# Patient Record
Sex: MALE | Race: BLACK OR AFRICAN AMERICAN | NOT HISPANIC OR LATINO | URBAN - METROPOLITAN AREA
[De-identification: names, ages, dates, MRNs, and addresses within clinical notes are randomized per-mention and may not be internally consistent; named-entity substitution may affect disease eponyms.]

---

## 2021-09-21 ENCOUNTER — EMERGENCY (EMERGENCY)
Facility: HOSPITAL | Age: 31
LOS: 1 days | Discharge: HOME | End: 2021-09-21
Attending: EMERGENCY MEDICINE
Payer: COMMERCIAL

## 2021-09-21 VITALS
RESPIRATION RATE: 14 BRPM | SYSTOLIC BLOOD PRESSURE: 107 MMHG | DIASTOLIC BLOOD PRESSURE: 78 MMHG | OXYGEN SATURATION: 96 % | HEART RATE: 72 BPM | TEMPERATURE: 98 F

## 2021-09-21 VITALS
TEMPERATURE: 98 F | OXYGEN SATURATION: 97 % | HEIGHT: 69 IN | RESPIRATION RATE: 18 BRPM | DIASTOLIC BLOOD PRESSURE: 76 MMHG | SYSTOLIC BLOOD PRESSURE: 117 MMHG | HEART RATE: 85 BPM | WEIGHT: 220.02 LBS

## 2021-09-21 LAB
ALBUMIN SERPL ELPH-MCNC: 4.1 G/DL — SIGNIFICANT CHANGE UP (ref 3.3–5)
ALP SERPL-CCNC: 54 U/L — SIGNIFICANT CHANGE UP (ref 40–120)
ALT FLD-CCNC: 24 U/L — SIGNIFICANT CHANGE UP (ref 10–45)
ANION GAP SERPL CALC-SCNC: 17 MMOL/L — SIGNIFICANT CHANGE UP (ref 5–17)
APTT BLD: 27.3 SEC — LOW (ref 27.5–35.5)
AST SERPL-CCNC: 23 U/L — SIGNIFICANT CHANGE UP (ref 10–40)
BASOPHILS # BLD AUTO: 0.03 K/UL — SIGNIFICANT CHANGE UP (ref 0–0.2)
BASOPHILS NFR BLD AUTO: 0.4 % — SIGNIFICANT CHANGE UP (ref 0–2)
BILIRUB SERPL-MCNC: 0.2 MG/DL — SIGNIFICANT CHANGE UP (ref 0.2–1.2)
BUN SERPL-MCNC: 9 MG/DL — SIGNIFICANT CHANGE UP (ref 7–23)
CALCIUM SERPL-MCNC: 9 MG/DL — SIGNIFICANT CHANGE UP (ref 8.4–10.5)
CHLORIDE SERPL-SCNC: 106 MMOL/L — SIGNIFICANT CHANGE UP (ref 96–108)
CO2 SERPL-SCNC: 18 MMOL/L — LOW (ref 22–31)
CREAT SERPL-MCNC: 1.05 MG/DL — SIGNIFICANT CHANGE UP (ref 0.5–1.3)
EOSINOPHIL # BLD AUTO: 0.18 K/UL — SIGNIFICANT CHANGE UP (ref 0–0.5)
EOSINOPHIL NFR BLD AUTO: 2.2 % — SIGNIFICANT CHANGE UP (ref 0–6)
ETHANOL SERPL-MCNC: 167 MG/DL — HIGH (ref 0–10)
GLUCOSE SERPL-MCNC: 104 MG/DL — HIGH (ref 70–99)
HCT VFR BLD CALC: 46.1 % — SIGNIFICANT CHANGE UP (ref 39–50)
HGB BLD-MCNC: 15.3 G/DL — SIGNIFICANT CHANGE UP (ref 13–17)
IMM GRANULOCYTES NFR BLD AUTO: 0.4 % — SIGNIFICANT CHANGE UP (ref 0–1.5)
INR BLD: 0.98 RATIO — SIGNIFICANT CHANGE UP (ref 0.88–1.16)
LIDOCAIN IGE QN: 15 U/L — SIGNIFICANT CHANGE UP (ref 7–60)
LYMPHOCYTES # BLD AUTO: 2.34 K/UL — SIGNIFICANT CHANGE UP (ref 1–3.3)
LYMPHOCYTES # BLD AUTO: 28 % — SIGNIFICANT CHANGE UP (ref 13–44)
MCHC RBC-ENTMCNC: 28.9 PG — SIGNIFICANT CHANGE UP (ref 27–34)
MCHC RBC-ENTMCNC: 33.2 GM/DL — SIGNIFICANT CHANGE UP (ref 32–36)
MCV RBC AUTO: 87 FL — SIGNIFICANT CHANGE UP (ref 80–100)
MONOCYTES # BLD AUTO: 0.46 K/UL — SIGNIFICANT CHANGE UP (ref 0–0.9)
MONOCYTES NFR BLD AUTO: 5.5 % — SIGNIFICANT CHANGE UP (ref 2–14)
NEUTROPHILS # BLD AUTO: 5.33 K/UL — SIGNIFICANT CHANGE UP (ref 1.8–7.4)
NEUTROPHILS NFR BLD AUTO: 63.5 % — SIGNIFICANT CHANGE UP (ref 43–77)
NRBC # BLD: 0 /100 WBCS — SIGNIFICANT CHANGE UP (ref 0–0)
PLATELET # BLD AUTO: 287 K/UL — SIGNIFICANT CHANGE UP (ref 150–400)
POTASSIUM SERPL-MCNC: 3.9 MMOL/L — SIGNIFICANT CHANGE UP (ref 3.5–5.3)
POTASSIUM SERPL-SCNC: 3.9 MMOL/L — SIGNIFICANT CHANGE UP (ref 3.5–5.3)
PROT SERPL-MCNC: 6.7 G/DL — SIGNIFICANT CHANGE UP (ref 6–8.3)
PROTHROM AB SERPL-ACNC: 11.8 SEC — SIGNIFICANT CHANGE UP (ref 10.6–13.6)
RBC # BLD: 5.3 M/UL — SIGNIFICANT CHANGE UP (ref 4.2–5.8)
RBC # FLD: 12.5 % — SIGNIFICANT CHANGE UP (ref 10.3–14.5)
SODIUM SERPL-SCNC: 141 MMOL/L — SIGNIFICANT CHANGE UP (ref 135–145)
WBC # BLD: 8.37 K/UL — SIGNIFICANT CHANGE UP (ref 3.8–10.5)
WBC # FLD AUTO: 8.37 K/UL — SIGNIFICANT CHANGE UP (ref 3.8–10.5)

## 2021-09-21 PROCEDURE — 80307 DRUG TEST PRSMV CHEM ANLYZR: CPT

## 2021-09-21 PROCEDURE — 74177 CT ABD & PELVIS W/CONTRAST: CPT | Mod: 26,MA

## 2021-09-21 PROCEDURE — 85610 PROTHROMBIN TIME: CPT

## 2021-09-21 PROCEDURE — 83690 ASSAY OF LIPASE: CPT

## 2021-09-21 PROCEDURE — 99284 EMERGENCY DEPT VISIT MOD MDM: CPT | Mod: 25

## 2021-09-21 PROCEDURE — 74177 CT ABD & PELVIS W/CONTRAST: CPT | Mod: MA

## 2021-09-21 PROCEDURE — 85730 THROMBOPLASTIN TIME PARTIAL: CPT

## 2021-09-21 PROCEDURE — 70450 CT HEAD/BRAIN W/O DYE: CPT | Mod: MA

## 2021-09-21 PROCEDURE — 71260 CT THORAX DX C+: CPT | Mod: MA

## 2021-09-21 PROCEDURE — 70450 CT HEAD/BRAIN W/O DYE: CPT | Mod: 26,MA

## 2021-09-21 PROCEDURE — 82962 GLUCOSE BLOOD TEST: CPT

## 2021-09-21 PROCEDURE — 85025 COMPLETE CBC W/AUTO DIFF WBC: CPT

## 2021-09-21 PROCEDURE — 71260 CT THORAX DX C+: CPT | Mod: 26,MA

## 2021-09-21 PROCEDURE — 72125 CT NECK SPINE W/O DYE: CPT | Mod: MA

## 2021-09-21 PROCEDURE — 80053 COMPREHEN METABOLIC PANEL: CPT

## 2021-09-21 PROCEDURE — 99285 EMERGENCY DEPT VISIT HI MDM: CPT

## 2021-09-21 PROCEDURE — 71045 X-RAY EXAM CHEST 1 VIEW: CPT | Mod: 26

## 2021-09-21 PROCEDURE — 71045 X-RAY EXAM CHEST 1 VIEW: CPT

## 2021-09-21 PROCEDURE — 72125 CT NECK SPINE W/O DYE: CPT | Mod: 26,MA

## 2021-09-21 RX ORDER — ACETAMINOPHEN 500 MG
975 TABLET ORAL ONCE
Refills: 0 | Status: COMPLETED | OUTPATIENT
Start: 2021-09-21 | End: 2021-09-21

## 2021-09-21 RX ADMIN — Medication 975 MILLIGRAM(S): at 17:20

## 2021-09-21 NOTE — ED PROVIDER NOTE - ATTENDING CONTRIBUTION TO CARE
Attending MD Chau:   I personally have seen and examined this patient.  Physician assistant note reviewed and agree on plan of care and except where noted.  See HPI, PE, and MDM for details.
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
## 2021-09-21 NOTE — ED PROVIDER NOTE - PATIENT PORTAL LINK FT
You can access the FollowMyHealth Patient Portal offered by [facility] by registering at the following website: [email or web address]. By joining [facility]’s FollowMyHealth portal, you will also be able to view your health information using other applications (apps) compatible with our system.
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
## 2021-09-21 NOTE — ED PROVIDER NOTE - PHYSICAL EXAMINATION
GEN: Pt non-toxic in NAD, A&Ox3. Breath smells of etoh.  PSYCH: Affect and mood appropriate. Conversating appropriately. Nonaggressive.   EYES: Sclera white w/o injection, PERRLA, b/l horizontal nystagmus.  ENT: Head NCAT, small forehead abrasion. Airway patent. No cspine ttp, c-collar in place.   RESP: No chest wall tenderness, CTA b/l, no wheezes, rales, or rhonchi.   CARDIAC: RRR, clear distinct S1, S2, no appreciable murmurs.  ABD: Abdomen soft, non-tender.  MSK: Moving all extremities.  NEURO: No focal motor or sensory deficits.  VASC: Radial pulses 2+ b/l. No edema or calf tenderness.  SKIN: +non-bleeding abrasion L shin.
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
## 2024-06-20 ENCOUNTER — EMERGENCY (EMERGENCY)
Facility: HOSPITAL | Age: 34
LOS: 0 days | Discharge: HOME | End: 2024-06-20
Attending: EMERGENCY MEDICINE
Payer: MEDICAID

## 2024-06-20 VITALS
OXYGEN SATURATION: 97 % | DIASTOLIC BLOOD PRESSURE: 70 MMHG | HEART RATE: 72 BPM | WEIGHT: 166.89 LBS | RESPIRATION RATE: 20 BRPM | HEIGHT: 72 IN | TEMPERATURE: 99 F | SYSTOLIC BLOOD PRESSURE: 113 MMHG

## 2024-06-20 DIAGNOSIS — R07.89 OTHER CHEST PAIN: ICD-10-CM
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
## 2024-06-20 DIAGNOSIS — R06.02 SHORTNESS OF BREATH: ICD-10-CM
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
## 2024-06-20 LAB
ALBUMIN SERPL ELPH-MCNC: 4 G/DL — SIGNIFICANT CHANGE UP (ref 3.5–5.2)
ALP SERPL-CCNC: 51 U/L — SIGNIFICANT CHANGE UP (ref 30–115)
ALT FLD-CCNC: 16 U/L — SIGNIFICANT CHANGE UP (ref 0–41)
ANION GAP SERPL CALC-SCNC: 13 MMOL/L — SIGNIFICANT CHANGE UP (ref 7–14)
AST SERPL-CCNC: 19 U/L — SIGNIFICANT CHANGE UP (ref 0–41)
BASOPHILS # BLD AUTO: 0.04 K/UL — SIGNIFICANT CHANGE UP (ref 0–0.2)
BASOPHILS NFR BLD AUTO: 0.6 % — SIGNIFICANT CHANGE UP (ref 0–1)
BILIRUB SERPL-MCNC: 0.7 MG/DL — SIGNIFICANT CHANGE UP (ref 0.2–1.2)
BUN SERPL-MCNC: 9 MG/DL — LOW (ref 10–20)
CALCIUM SERPL-MCNC: 9.2 MG/DL — SIGNIFICANT CHANGE UP (ref 8.4–10.5)
CHLORIDE SERPL-SCNC: 104 MMOL/L — SIGNIFICANT CHANGE UP (ref 98–110)
CO2 SERPL-SCNC: 24 MMOL/L — SIGNIFICANT CHANGE UP (ref 17–32)
CREAT SERPL-MCNC: 1.2 MG/DL — SIGNIFICANT CHANGE UP (ref 0.7–1.5)
EGFR: 81 ML/MIN/1.73M2 — SIGNIFICANT CHANGE UP
EOSINOPHIL # BLD AUTO: 0.28 K/UL — SIGNIFICANT CHANGE UP (ref 0–0.7)
EOSINOPHIL NFR BLD AUTO: 4.5 % — SIGNIFICANT CHANGE UP (ref 0–8)
GLUCOSE SERPL-MCNC: 89 MG/DL — SIGNIFICANT CHANGE UP (ref 70–99)
HCT VFR BLD CALC: 43.4 % — SIGNIFICANT CHANGE UP (ref 42–52)
HGB BLD-MCNC: 14.8 G/DL — SIGNIFICANT CHANGE UP (ref 14–18)
IMM GRANULOCYTES NFR BLD AUTO: 0.3 % — SIGNIFICANT CHANGE UP (ref 0.1–0.3)
LYMPHOCYTES # BLD AUTO: 2.26 K/UL — SIGNIFICANT CHANGE UP (ref 1.2–3.4)
LYMPHOCYTES # BLD AUTO: 36 % — SIGNIFICANT CHANGE UP (ref 20.5–51.1)
MCHC RBC-ENTMCNC: 30.6 PG — SIGNIFICANT CHANGE UP (ref 27–31)
MCHC RBC-ENTMCNC: 34.1 G/DL — SIGNIFICANT CHANGE UP (ref 32–37)
MCV RBC AUTO: 89.7 FL — SIGNIFICANT CHANGE UP (ref 80–94)
MONOCYTES # BLD AUTO: 0.46 K/UL — SIGNIFICANT CHANGE UP (ref 0.1–0.6)
MONOCYTES NFR BLD AUTO: 7.3 % — SIGNIFICANT CHANGE UP (ref 1.7–9.3)
NEUTROPHILS # BLD AUTO: 3.21 K/UL — SIGNIFICANT CHANGE UP (ref 1.4–6.5)
NEUTROPHILS NFR BLD AUTO: 51.3 % — SIGNIFICANT CHANGE UP (ref 42.2–75.2)
NRBC # BLD: 0 /100 WBCS — SIGNIFICANT CHANGE UP (ref 0–0)
PLATELET # BLD AUTO: 254 K/UL — SIGNIFICANT CHANGE UP (ref 130–400)
PMV BLD: 10.9 FL — HIGH (ref 7.4–10.4)
POTASSIUM SERPL-MCNC: 3.8 MMOL/L — SIGNIFICANT CHANGE UP (ref 3.5–5)
POTASSIUM SERPL-SCNC: 3.8 MMOL/L — SIGNIFICANT CHANGE UP (ref 3.5–5)
PROT SERPL-MCNC: 6.4 G/DL — SIGNIFICANT CHANGE UP (ref 6–8)
RBC # BLD: 4.84 M/UL — SIGNIFICANT CHANGE UP (ref 4.7–6.1)
RBC # FLD: 12.3 % — SIGNIFICANT CHANGE UP (ref 11.5–14.5)
SODIUM SERPL-SCNC: 141 MMOL/L — SIGNIFICANT CHANGE UP (ref 135–146)
TROPONIN T, HIGH SENSITIVITY RESULT: 12 NG/L — SIGNIFICANT CHANGE UP (ref 6–21)
WBC # BLD: 6.27 K/UL — SIGNIFICANT CHANGE UP (ref 4.8–10.8)
WBC # FLD AUTO: 6.27 K/UL — SIGNIFICANT CHANGE UP (ref 4.8–10.8)

## 2024-06-20 PROCEDURE — 36000 PLACE NEEDLE IN VEIN: CPT

## 2024-06-20 PROCEDURE — 71045 X-RAY EXAM CHEST 1 VIEW: CPT | Mod: 26

## 2024-06-20 PROCEDURE — 93010 ELECTROCARDIOGRAM REPORT: CPT

## 2024-06-20 PROCEDURE — 84484 ASSAY OF TROPONIN QUANT: CPT

## 2024-06-20 PROCEDURE — 36415 COLL VENOUS BLD VENIPUNCTURE: CPT

## 2024-06-20 PROCEDURE — 93005 ELECTROCARDIOGRAM TRACING: CPT

## 2024-06-20 PROCEDURE — 71045 X-RAY EXAM CHEST 1 VIEW: CPT

## 2024-06-20 PROCEDURE — 99285 EMERGENCY DEPT VISIT HI MDM: CPT

## 2024-06-20 PROCEDURE — 85025 COMPLETE CBC W/AUTO DIFF WBC: CPT

## 2024-06-20 PROCEDURE — 80053 COMPREHEN METABOLIC PANEL: CPT

## 2024-06-20 PROCEDURE — 99285 EMERGENCY DEPT VISIT HI MDM: CPT | Mod: 25

## 2024-06-20 RX ORDER — ACETAMINOPHEN 500 MG
975 TABLET ORAL ONCE
Refills: 0 | Status: COMPLETED | OUTPATIENT
Start: 2024-06-20 | End: 2024-06-20

## 2024-06-20 RX ADMIN — Medication 975 MILLIGRAM(S): at 12:47

## 2024-06-20 NOTE — ED PROVIDER NOTE - CLINICAL SUMMARY MEDICAL DECISION MAKING FREE TEXT BOX
Patient: Ravi Park    Procedure Summary     Date:  09/13/18 Room / Location:  [facility] OR 02 / [facility] OR    Anesthesia Start:  1947 Anesthesia Stop:  2023    Procedure:  CYSTOSCOPY. LEFT J-STENT REMOVAL, PLACEMENT SUPRAPUBIC CATHETER (Left Urethra) Diagnosis:       Acute cystitis with hematuria      (Acute cystitis with hematuria [N30.01])    Surgeon:  Stephen Serrano MD Provider:  Rodney Ruby MD    Anesthesia Type:  general ASA Status:  4          Anesthesia Type: general  Last vitals  BP   155/72 (09/13/18 1821)   Temp   97.6 °F (36.4 °C) (09/13/18 1821)   Pulse   73 (09/13/18 1821)   Resp   18 (09/13/18 1821)     SpO2   100 % (09/13/18 1821)     Post Anesthesia Care and Evaluation    Patient location during evaluation: bedside  Patient participation: complete - patient participated  Level of consciousness: awake and alert  Pain score: 0  Pain management: adequate  Airway patency: patent  Anesthetic complications: No anesthetic complications  PONV Status: none  Cardiovascular status: acceptable  Respiratory status: acceptable  Hydration status: acceptable      
Independent interpretation of the X-Ray film(s) performed by MD Machado  Independent interpretation of the EKG performed by MD Machado    DX: CHEST PAIN. Patient's labs WNL, cardiac enzymes and EKG non-diagnostic and without signs of active ischemia so doubt NSTEMI, HEART score 0, atypical presentation for PE, dissection, pneumothorax (not visualized on chest xr), pericarditis, tamponade, pneumonia (no infectious symptoms, clear chest xr), myocarditis (no recent illness, neg trop). Exam without evidence of volume overload so doubt heart failure.     These elements were d/w the pt. It was explained that initial testing was unremarkable, it does not appear that they are having a heart attack, symptoms are less likely due to cardiac etiology. It was explained that the risk of 30-day major adverse cardiac event upon discharge is low and they do not need admission at this time. Was explained that the source of their symptoms is unclear and that the patient should still follow up with their primary physician or cardiology for further evaluation and management.    The patient was given detailed return precautions and advised to return to the emergency department if any new symptoms developed, symptoms worsened or for any concerns. The patient was offered the opportunity to ask questions and verbalized that they understand the diagnosis and discharge instructions.

## 2024-06-20 NOTE — ED PROVIDER NOTE - ATTENDING CONTRIBUTION TO CARE
I have reviewed and agree with the mid-level note, except as documented in my note below.    34-year-old male denies significant PMH/PSH, non-smoker, denies daily alcohol use, denies FH CAD, male presents with right-sided chest discomfort for the past several days, intermittent, denies radiation, somewhat positional, denies exertional chest pain, fever, nausea, vomiting, diaphoresis, hemoptysis, SOB, orthopnea, PND, LE pain or swelling, recent immobilization or travel or other associated complaints at present. Old chart reviewed. I have reviewed and agree with the initial nursing note, except as documented in my note.    VSS, awake, alert, non-toxic appearing, oropharynx clear, mmm, no JVD or bruit, no skin rash or lesions, chest CTAB, reproducible chest wall ttp, non-labored breathing, no w/r/r, +S1/S2, RRR, no m/r/g, abdomen soft, NT, ND, +BS, no organomegaly or pulsatile masses, no peripheral edema or deformities, equal pulses upper and lower extremities, alert, clear speech and steady gait.

## 2024-06-20 NOTE — ED PROVIDER NOTE - IV ALTEPLASE EXCL ABS HIDDEN
Pt refusing to get dialysis tomorrow 3/4. Dr Schulz & Dr Myers notified and to see patient in the AM   show
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
## 2024-06-20 NOTE — ED ADULT NURSE NOTE - NSFALLUNIVINTERV_ED_ALL_ED
Bed/Stretcher in lowest position, wheels locked, appropriate side rails in place/Call bell, personal items and telephone in reach/Instruct patient to call for assistance before getting out of bed/chair/stretcher/Non-slip footwear applied when patient is off stretcher/[place] to call system/Physically safe environment - no spills, clutter or unnecessary equipment/Purposeful proactive rounding/Room/bathroom lighting operational, light cord in reach Doxepin Counseling:  Patient advised that the medication is sedating and not to drive a car after taking this medication. Patient informed of potential adverse effects including but not limited to dry mouth, urinary retention, and blurry vision.  The patient verbalized understanding of the proper use and possible adverse effects of doxepin.  All of the patient's questions and concerns were addressed.

## 2024-06-20 NOTE — ED PROVIDER NOTE - OBJECTIVE STATEMENT
Patient is a 34 year old male with no significant PMH, no PSH, and no pertinent family history presenting for chest pain. Patient endorses right-sided non-radiating constant chest pain x2 days, associated with exertional shortness of breath. Patient states his symptoms began after lifting heavy objects/cleaning at home. Patient has not taken any medications for pain relief. Patient has never seen a cardiologist. Patient denies nausea, vomiting, fevers, night sweats, weight loss, hemoptysis, palpitations, alcohol or substance abuse, urinary symptoms, or additional concerns.

## 2024-06-20 NOTE — ED PROVIDER NOTE - NSFOLLOWUPINSTRUCTIONS_ED_ALL_ED_FT
Follow up with your doctor in 1-2 days.  If you do not have a doctor, CALL [phone] to find a physician to follow up with.    Chest Pain    Chest pain can be caused by many different conditions which may or may not be dangerous. Causes include heartburn, lung infections, heart attack, blood clot in lungs, skin infections, strain or damage to muscle, cartilage, or bones, etc. In addition to a history and physical examination, an electrocardiogram (ECG) or other lab tests may have been performed to determine the cause of your chest pain. Follow up with your primary care provider or with a cardiologist as instructed.     SEEK IMMEDIATE MEDICAL CARE IF YOU HAVE ANY OF THE FOLLOWING SYMPTOMS: worsening chest pain, coughing up blood, unexplained back/neck/jaw pain, severe abdominal pain, dizziness or lightheadedness, fainting, shortness of breath, sweaty or clammy skin, vomiting, or racing heart beat. These symptoms may represent a serious problem that is an emergency. Do not wait to see if the symptoms will go away. Get medical help right away. Call 911 and do not drive yourself to the hospital.

## 2024-06-20 NOTE — ED PROVIDER NOTE - PATIENT PORTAL LINK FT
You can access the FollowMyHealth Patient Portal offered by [facility] by registering at the following website: [email or web address]. By joining [facility]’s FollowMyHealth portal, you will also be able to view your health information using other applications (apps) compatible with our system.

## 2024-12-06 ENCOUNTER — INPATIENT (INPATIENT)
Facility: HOSPITAL | Age: 34
LOS: 6 days | Discharge: HOME | DRG: 751 | End: 2024-12-13
Attending: PSYCHIATRY & NEUROLOGY | Admitting: PSYCHIATRY & NEUROLOGY
Payer: MEDICAID

## 2024-12-06 VITALS
OXYGEN SATURATION: 99 % | WEIGHT: 283.96 LBS | RESPIRATION RATE: 18 BRPM | SYSTOLIC BLOOD PRESSURE: 112 MMHG | TEMPERATURE: 98 F | HEIGHT: 73 IN | HEART RATE: 84 BPM | DIASTOLIC BLOOD PRESSURE: 77 MMHG

## 2024-12-06 LAB
ANION GAP SERPL CALC-SCNC: 11 MMOL/L — SIGNIFICANT CHANGE UP (ref 7–14)
APAP SERPL-MCNC: <5 UG/ML — LOW (ref 10–30)
BASOPHILS # BLD AUTO: 0.04 K/UL — SIGNIFICANT CHANGE UP (ref 0–0.2)
BASOPHILS NFR BLD AUTO: 0.5 % — SIGNIFICANT CHANGE UP (ref 0–1)
BUN SERPL-MCNC: 11 MG/DL — SIGNIFICANT CHANGE UP (ref 10–20)
CALCIUM SERPL-MCNC: 8.7 MG/DL — SIGNIFICANT CHANGE UP (ref 8.4–10.5)
CHLORIDE SERPL-SCNC: 101 MMOL/L — SIGNIFICANT CHANGE UP (ref 98–110)
CO2 SERPL-SCNC: 22 MMOL/L — SIGNIFICANT CHANGE UP (ref 17–32)
CREAT SERPL-MCNC: 1 MG/DL — SIGNIFICANT CHANGE UP (ref 0.7–1.5)
EGFR: 101 ML/MIN/1.73M2 — SIGNIFICANT CHANGE UP
EOSINOPHIL # BLD AUTO: 0.41 K/UL — SIGNIFICANT CHANGE UP (ref 0–0.7)
EOSINOPHIL NFR BLD AUTO: 5.4 % — SIGNIFICANT CHANGE UP (ref 0–8)
ETHANOL SERPL-MCNC: 118 MG/DL — HIGH
GLUCOSE SERPL-MCNC: 90 MG/DL — SIGNIFICANT CHANGE UP (ref 70–99)
HCT VFR BLD CALC: 45 % — SIGNIFICANT CHANGE UP (ref 42–52)
HGB BLD-MCNC: 15.4 G/DL — SIGNIFICANT CHANGE UP (ref 14–18)
IMM GRANULOCYTES NFR BLD AUTO: 0.3 % — SIGNIFICANT CHANGE UP (ref 0.1–0.3)
LYMPHOCYTES # BLD AUTO: 2.81 K/UL — SIGNIFICANT CHANGE UP (ref 1.2–3.4)
LYMPHOCYTES # BLD AUTO: 37.1 % — SIGNIFICANT CHANGE UP (ref 20.5–51.1)
MCHC RBC-ENTMCNC: 29.8 PG — SIGNIFICANT CHANGE UP (ref 27–31)
MCHC RBC-ENTMCNC: 34.2 G/DL — SIGNIFICANT CHANGE UP (ref 32–37)
MCV RBC AUTO: 87 FL — SIGNIFICANT CHANGE UP (ref 80–94)
MONOCYTES # BLD AUTO: 0.56 K/UL — SIGNIFICANT CHANGE UP (ref 0.1–0.6)
MONOCYTES NFR BLD AUTO: 7.4 % — SIGNIFICANT CHANGE UP (ref 1.7–9.3)
NEUTROPHILS # BLD AUTO: 3.74 K/UL — SIGNIFICANT CHANGE UP (ref 1.4–6.5)
NEUTROPHILS NFR BLD AUTO: 49.3 % — SIGNIFICANT CHANGE UP (ref 42.2–75.2)
NRBC # BLD: 0 /100 WBCS — SIGNIFICANT CHANGE UP (ref 0–0)
PLATELET # BLD AUTO: 259 K/UL — SIGNIFICANT CHANGE UP (ref 130–400)
PMV BLD: 10.4 FL — SIGNIFICANT CHANGE UP (ref 7.4–10.4)
POTASSIUM SERPL-MCNC: 3.8 MMOL/L — SIGNIFICANT CHANGE UP (ref 3.5–5)
POTASSIUM SERPL-SCNC: 3.8 MMOL/L — SIGNIFICANT CHANGE UP (ref 3.5–5)
RBC # BLD: 5.17 M/UL — SIGNIFICANT CHANGE UP (ref 4.7–6.1)
RBC # FLD: 12.6 % — SIGNIFICANT CHANGE UP (ref 11.5–14.5)
SALICYLATES SERPL-MCNC: <0.3 MG/DL — LOW (ref 4–30)
SODIUM SERPL-SCNC: 134 MMOL/L — LOW (ref 135–146)
WBC # BLD: 7.58 K/UL — SIGNIFICANT CHANGE UP (ref 4.8–10.8)
WBC # FLD AUTO: 7.58 K/UL — SIGNIFICANT CHANGE UP (ref 4.8–10.8)

## 2024-12-06 PROCEDURE — 99223 1ST HOSP IP/OBS HIGH 75: CPT

## 2024-12-06 NOTE — ED PROVIDER NOTE - OBJECTIVE STATEMENT
34-year-old male no reported past medical history presents to the ED for evaluation for suicidal ideations.  Patient with several days of suicidal ideations.  Patient reports that these thoughts were triggered after he was evicted from his apartment, reports that he feels as though he let his father down.  Has also been struggling to obtain his ideal job at the [employer].  States that he feels like a loser and that he should just give it up.  His plan would be to jump off the [place] [employer] and drown as he cannot swim.  Denies any visual or auditory hallucinations, paranoia.

## 2024-12-06 NOTE — ED PROVIDER NOTE - CLINICAL SUMMARY MEDICAL DECISION MAKING FREE TEXT BOX
34-year-old male no reported past medical history presents to the ED for evaluation for suicidal ideations. exam showed Si otherwise unremarkable. labs elevated blood alcohol but clinically now sober. placed in EDOU for psychiatric evaluation

## 2024-12-06 NOTE — ED PROVIDER NOTE - ATTENDING APP SHARED VISIT CONTRIBUTION OF CARE
34-year-old male no reported past medical history presents to the ED for evaluation for suicidal ideations.  Patient with several days of suicidal ideations. plan for [place] [address]  CONSTITUTIONAL: well developed; in no acute distress  HEAD: normocephalic; atraumatic  EYES: no conjunctival injection, no scleral icterus  ENT: no nasal discharge; airway clear.  NECK: supple; non tender.  CARD: warm and well perfused, not tachycardic  RESP: breathing comfortably on RA, speaking in full sentences w/o distress  Abdomen: Soft, None Tender, None Distended   EXT: moving all extremities spontaneously, normal ROM.  NEURO: alert, oriented, motor and sensory grossly intact,  PSYCH: calm, cooperative SI no HI

## 2024-12-06 NOTE — ED PROVIDER NOTE - BIRTH SEX
ID ATTENDING ADDENDUM:     Patient seen and examined independently.  Agree with documentation below.     2 of 2 blood cultures with late growth of corynebacterium imitans-suspect these might be contaminants.  Repeat blood cultures negative at 24 hours.  Await CT abdomen pelvis.  If CT abdomen pelvis is we will plan on discontinuing both vancomycin and Zosyn.     Maria Del Rosario Espino MD   [phone]       --------------------------------------------------------------------------------------------------------------------------------------------------------------------------------------          INFECTIOUS DISEASE PROGRESS NOTE    ASSESSMENT:   1.  2 sets of blood cultures with corynebacterium  2.  ESRD on HD  3.  Acute encephalopathy  4.  History of dementia    PLAN:   - continue vancomycin and Zosyn at this time  - follow up on repeat blood cultures, so far negative to date  - follow up on CT abdomen/pelvis, still needs to be completed at this time    Maggie Jenkins NP   [phone]    -------------------------------------------------------------------------------------------------------------------    SUBJECTIVE:    Patient seen and chart reviewed  Afebrile  No complaints     OBJECTIVE:  Tmax Temp (24hrs), Av.5 °F (36.4 °C), Min:96.6 °F (35.9 °C), Max:98.6 °F (37 °C)     Last Vitals   Vitals:    22 1547   BP: 104/67   Pulse: 98   Resp:    Temp: 98.6 °F (37 °C)       Gen: NAD, awake  HEENT: Anicteric, poor dentition, oropharynx is clear  CV: RRR  Pulm: Clear breath sounds bilaterally, no increased respiratory effort  Abd: soft, non-tender, non-distended, positive bowel sounds  Ext: no edema, left AV fistula site clean  Psych: calm  Skin: no rash    ANTIMICROBIALS  Vancomycin  Zosyn    LAB:  Recent Labs     22  1243 22  0610   WBC 7.2 7.7   HGB 14.0 14.4   HCT 41.4 43.3    167   MCV 94.3 93.9       Recent Labs   Lab 22  1243 22  1053 22   SODIUM 136 135 134*  Male   POTASSIUM 4.2 4.7 5.1   CHLORIDE 100 101 98   CO2 30 26 27   BUN 45* 61* 46*   CREATININE 9.11* 11.40* 9.01*   GLUCOSE 98 102* 165*   CALCIUM 8.7 8.7 9.7       Microbiology Results  (Last 10 results in the past 7 days)    Specimen   Gram Smear   Culture Result   Status       01/26/22  4677         Gram positive bacilli.  Comment: Detected from aerobic bottle after 3 Days and 2 hours.   [P]            Gram positive bacilli.  Comment: Detected from aerobic bottle after 2 Days and 17 hours.   [P]                 [P] - Preliminary Result               RADIOLOGY: images reviewed       Note:  Assessment and Plan have been moved to the top of the screen for ease of the viewer such that the plan can be seen without scrolling to the bottom of the note.

## 2024-12-07 DIAGNOSIS — F32.2 MAJOR DEPRESSIVE DISORDER, SINGLE EPISODE, SEVERE WITHOUT PSYCHOTIC FEATURES: ICD-10-CM

## 2024-12-07 DIAGNOSIS — R45.851 SUICIDAL IDEATIONS: ICD-10-CM

## 2024-12-07 DIAGNOSIS — F43.20 ADJUSTMENT DISORDER, UNSPECIFIED: ICD-10-CM

## 2024-12-07 PROBLEM — Z78.9 OTHER SPECIFIED HEALTH STATUS: Chronic | Status: ACTIVE | Noted: 2024-06-20

## 2024-12-07 LAB
AMPHET UR-MCNC: NEGATIVE — SIGNIFICANT CHANGE UP
BARBITURATES UR SCN-MCNC: NEGATIVE — SIGNIFICANT CHANGE UP
BENZODIAZ UR-MCNC: NEGATIVE — SIGNIFICANT CHANGE UP
COCAINE METAB.OTHER UR-MCNC: NEGATIVE — SIGNIFICANT CHANGE UP
FENTANYL UR QL: NEGATIVE — SIGNIFICANT CHANGE UP
METHADONE UR-MCNC: NEGATIVE — SIGNIFICANT CHANGE UP
OPIATES UR-MCNC: NEGATIVE — SIGNIFICANT CHANGE UP
PCP SPEC-MCNC: SIGNIFICANT CHANGE UP
PROPOXYPHENE QUALITATIVE URINE RESULT: NEGATIVE — SIGNIFICANT CHANGE UP
SARS-COV-2 RNA SPEC QL NAA+PROBE: SIGNIFICANT CHANGE UP

## 2024-12-07 PROCEDURE — 90792 PSYCH DIAG EVAL W/MED SRVCS: CPT | Mod: 95

## 2024-12-07 PROCEDURE — 99232 SBSQ HOSP IP/OBS MODERATE 35: CPT | Mod: GC

## 2024-12-07 PROCEDURE — 80354 DRUG SCREENING FENTANYL: CPT

## 2024-12-07 PROCEDURE — 80307 DRUG TEST PRSMV CHEM ANLYZR: CPT

## 2024-12-07 RX ORDER — INFLUENZA VIRUS VACCINE 15; 15; 15; 15 UG/.5ML; UG/.5ML; UG/.5ML; UG/.5ML
0.5 SUSPENSION INTRAMUSCULAR ONCE
Refills: 0 | Status: COMPLETED | OUTPATIENT
Start: 2024-12-07 | End: 2024-12-07

## 2024-12-07 RX ORDER — HALOPERIDOL 2 MG
5 TABLET ORAL EVERY 8 HOURS
Refills: 0 | Status: DISCONTINUED | OUTPATIENT
Start: 2024-12-07 | End: 2024-12-13

## 2024-12-07 RX ORDER — SERTRALINE HYDROCHLORIDE 100 MG/1
50 TABLET, FILM COATED ORAL DAILY
Refills: 0 | Status: DISCONTINUED | OUTPATIENT
Start: 2024-12-07 | End: 2024-12-13

## 2024-12-07 RX ORDER — LORAZEPAM 2 MG/1
2 TABLET ORAL EVERY 8 HOURS
Refills: 0 | Status: DISCONTINUED | OUTPATIENT
Start: 2024-12-07 | End: 2024-12-13

## 2024-12-07 RX ADMIN — SERTRALINE HYDROCHLORIDE 50 MILLIGRAM(S): 100 TABLET, FILM COATED ORAL at 08:19

## 2024-12-07 NOTE — ED ADULT NURSE REASSESSMENT NOTE - NS ED NURSE REASSESS COMMENT FT1
received handoff from RN. Pt AXOx4, RR even and unlabored, no distress noted. awaiting telepsych. 1:1 in progress.

## 2024-12-07 NOTE — ED BEHAVIORAL HEALTH ASSESSMENT NOTE - DESCRIPTION
calm, cooperative    Vital Signs Last 24 Hrs  T(C): 36.4 (12-07-24 @ 00:17), Max: 36.8 (12-06-24 @ 19:13)  T(F): 97.6 (12-07-24 @ 00:17), Max: 98.3 (12-06-24 @ 19:13)  HR: 74 (12-07-24 @ 00:17) (74 - 84)  BP: 103/67 (12-07-24 @ 00:17) (103/67 - 112/77)  BP(mean): 79 (12-07-24 @ 00:17) (79 - 79)  RR: 18 (12-07-24 @ 00:17) (18 - 18)  SpO2: 98% (12-07-24 @ 00:17) (98% - 99%) none lives alone

## 2024-12-07 NOTE — BH PATIENT PROFILE - FALL HARM RISK - UNIVERSAL INTERVENTIONS
Bed in lowest position, wheels locked, appropriate side rails in place/Call bell, personal items and telephone in reach/Instruct patient to call for assistance before getting out of bed or chair/Non-slip footwear when patient is out of bed/[place] to call system/Physically safe environment - no spills, clutter or unnecessary equipment/Purposeful Proactive Rounding/Room/bathroom lighting operational, light cord in reach

## 2024-12-07 NOTE — ED CDU PROVIDER INITIAL DAY NOTE - CLINICAL SUMMARY MEDICAL DECISION MAKING FREE TEXT BOX
Received pt in obs. Here with SI after getting evicted from his apartment. Labs wnl. Medically cleared. I spoke with telepsych attending who recommended voluntary admission to [facility] for SI. He requested UDS which they will f/u inpatient. Pt requires admission for further management of SI given risk for harm to self, psychiatric decompensation, etc if not closely monitored and tx'ed.

## 2024-12-07 NOTE — BH SAFETY PLAN - INTERNAL COPING STRATEGY 1
I like to go to the movies on Tuesdays at [employer] ($7 per ticket) because it is cheaper. I enjoy watching movies.

## 2024-12-07 NOTE — BH INPATIENT PSYCHIATRY ASSESSMENT NOTE - NSBHMETABOLIC_PSY_ALL_CORE_FT
BMI: BMI (kg/m2): 36.6 (12-07-24 @ 06:20)  HbA1c:   Glucose:   BP: 127/67 (12-07-24 @ 08:36) (98/64 - 127/67)Vital Signs Last 24 Hrs  T(C): 36.6 (12-07-24 @ 08:36), Max: 36.8 (12-06-24 @ 19:13)  T(F): 97.8 (12-07-24 @ 08:36), Max: 98.3 (12-06-24 @ 19:13)  HR: 78 (12-07-24 @ 08:36) (74 - 99)  BP: 127/67 (12-07-24 @ 08:36) (98/64 - 127/67)  BP(mean): 79 (12-07-24 @ 00:17) (79 - 79)  RR: 16 (12-07-24 @ 06:20) (16 - 18)  SpO2: 99% (12-07-24 @ 06:20) (98% - 100%)      Lipid Panel:  BMI: BMI (kg/m2): 36.6 (12-07-24 @ 06:20)  HbA1c:   Glucose:   BP: 138/97 (12-07-24 @ 15:57) (98/64 - 138/97)Vital Signs Last 24 Hrs  T(C): 36.6 (12-07-24 @ 15:57), Max: 36.8 (12-06-24 @ 19:13)  T(F): 97.9 (12-07-24 @ 15:57), Max: 98.3 (12-06-24 @ 19:13)  HR: 68 (12-07-24 @ 15:57) (68 - 99)  BP: 138/97 (12-07-24 @ 15:57) (98/64 - 138/97)  BP(mean): 79 (12-07-24 @ 00:17) (79 - 79)  RR: 16 (12-07-24 @ 06:20) (16 - 18)  SpO2: 99% (12-07-24 @ 06:20) (98% - 100%)      Lipid Panel:
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
## 2024-12-07 NOTE — ED BEHAVIORAL HEALTH ASSESSMENT NOTE - SUMMARY
Pt is a 35 y/o single M, no PMH/PPH, recently lost his job and housing after a 4 month period in the housing court in the context of falling behind in paying rent, has been at the th [facility] shelter, presented to the ER with SI with intent and plan to [place] after finding that his apartment was already locked and lost access to all his items including his social security card.    Pt reports that he has had SI since going to his apartment yesterday and finding it was locked. He said he still feels suicidal but he did not attempt as still has some will to live but presents ambivalent. Has never been in this situation before. Describes his attempt to become a  in the past year but never qualifying due to not meeting their physical/health metrics, after leaving his job at [employer] a year ago. Said he was relying on savings. Then he tried to get a job at [employer] during housing court but his orientation day overlapped with a court date. He said he did not know he could move his court date with a valid reason and missed his orientation to go to court, then lost this job which led to losing in court eventually. He describes significant shame and guilt over losing this apartment, feeling like he disappointed his father as his father passed down this apt to him after moving to [place] (has not informed father for this reason). Reports hopelessness, loneliness, says that he is all by himself in life. Reports feeling down for weeks, anhedonia, sleep is "trash" denies appetite/weight changes. Denies past suicidality or hospitalizations.     Pt presents with elevated suicide risk, agreeable to voluntary hospitalization and ssri trial at the hospital. Requires inpatient level of care due to safety risk. Pt is a 33 y/o single M, no PMH/PPH, recently lost his job and housing after a 4 month period in the housing court in the context of falling behind in paying rent, has been at the [address] [place] [address] shelter, presented to the ER with SI with intent and plan to jump off the [place] ferry into the water after finding that his apartment was already locked and lost access to all his items including his social security card.    Pt reports that he has had SI since going to his apartment yesterday and finding it was locked. He said he still feels suicidal but he did not attempt as still has some will to live but presents ambivalent. Has never been in this situation before. Describes his attempt to become a  in the past year but never qualifying due to not meeting their physical/health metrics, after leaving his job at [employer] a year ago. Said he was relying on savings. Then he tried to get a job at [employer] during housing court but his orientation day overlapped with a court date. He said he did not know he could move his court date with a valid reason and missed his orientation to go to court, then lost this job which led to losing in court eventually. He describes significant shame and guilt over losing this apartment, feeling like he disappointed his father as his father passed down this apt to him after moving to [place] (has not informed father for this reason). Reports hopelessness, loneliness, says that he is all by himself in life. Reports feeling down for weeks, anhedonia, sleep is "trash" denies appetite/weight changes. Denies past suicidality or hospitalizations.     Pt presents with elevated suicide risk, agreeable to voluntary hospitalization and ssri trial at the hospital. Risks/benefits/alternatives d/w pt, pt agrees to sertraline. Requires inpatient level of care due to safety risk.

## 2024-12-07 NOTE — BH INPATIENT PSYCHIATRY ASSESSMENT NOTE - NSBHASSESSSUMMFT_PSY_ALL_CORE
Pt is a 35 y/o single man, no PMH/PPH, recently lost his job and housing after a 4 month period in the housing court in the context of falling behind in paying rent, has been at the [facility] shelter, presented to the ER with SI with intent and plan to jump off the [place] into the water and drown after finding that his apartment was already locked and lost access to all his items including his social security card.    Upon evaluation, patient report feelings of depression, worthlessness, hopelessness, and helplessness along with recent SI with means and method. Most likely patient has adjustment disorder given the recency of onset of many of these symptoms, however given their intensity and severity they could likely develop into MDD if left untreated. Of note, patient has also been drinking 4-5 shots of hard liquor 2-3 times per week, which may be contributing to his low mood. Given patient's many acute risk factors such as his insecure living situation, his deep sense of guilt and shame, depression, feelings of worthlessness, lack of social supports, and recent SI with intent and means, the patient requires inpatient hospitalization for stabilization and initiation of psychiatric care.    #Depressive symptoms  - Continue Zoloft 50 mg  - Labs WNl    #Agitaiton  - For episodes of acute agitation can offer PO Haldol 5 mg/Ativan 2 mg/Benadryl 50 mg Q6H PRN. If refusing PO and is in acute risk of harm to self/other, can offer IM Haldol 5 mg/Ativan 2 mg/Benadryl 50 mg Q6H PRN. If given, please repeat EKG and hold antipsychotics if QTC>500

## 2024-12-07 NOTE — ED CDU PROVIDER INITIAL DAY NOTE - DIFFERENTIAL DIAGNOSIS
differential dx includes but is not limited to:  psychiatric disorder. less likely toxic ingestion, infection, metabolic derangement Differential Diagnosis

## 2024-12-07 NOTE — BH INPATIENT PSYCHIATRY ASSESSMENT NOTE - RISK ASSESSMENT
Pt is at elevated risk of suicide, reports SI with method and intent. Risk factors include patient's trauma history, hopelessness, helplessness, lack of social supports, current unemployement status, unstable housing, recent suicidal ideation with method and intent. Protective factors include patient's lack of psychiatric history, lack of suicide attempts.

## 2024-12-07 NOTE — ED BEHAVIORAL HEALTH ASSESSMENT NOTE - DOMICILED WITH
Name: Alex Palm  : 1963         Chief Complaint:     Chief Complaint   Patient presents with    Gastroesophageal Reflux     starting to get worse. Prilosec wears off in the afternoon       History of Present Illness:      Alex Palm is a 60 y.o.  male who presents with Gastroesophageal Reflux (starting to get worse. Prilosec wears off in the afternoon)      Zoya Medellin is here for a routine office visit. 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 He plans on having his port removed in the near future after his next CT scan. He is doing well with regard to weight gain, eating, and routines of daily living. He is back to work. Gastroesophageal Reflux  He complains of heartburn. He reports no abdominal pain, no belching, no chest pain, no choking, no coughing, no dysphagia, no early satiety, no globus sensation, no hoarse voice, no nausea, no sore throat, no stridor, no tooth decay, no water brash or no wheezing. This is a chronic problem. The current episode started more than 1 year ago. The problem occurs occasionally. The problem has been unchanged. The heartburn duration is less than a minute. The heartburn is located in the substernum. The heartburn is of moderate intensity. The heartburn does not wake him from sleep. The heartburn does not limit his activity. The heartburn doesn't change with position. The symptoms are aggravated by certain foods, smoking and stress. Pertinent negatives include no anemia, fatigue, melena, muscle weakness or weight loss. Risk factors include lack of exercise, smoking/tobacco exposure and caffeine use. He has tried a PPI for the symptoms. The treatment provided significant relief. Past procedures do not include an EGD or a UGI. Past invasive treatments do not include gastroplasty, gastroplication or reflux surgery.          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  ERCP  8/7/2020    ERCP STENT INSERTION performed by Cheyanne Wise MD at [address] PORT SURGERY Left 09/18/2020    left subclavian port insertion    PORT SURGERY Left 9/18/2020    PORT INSERTION, SUBCLAVIAN, W/C-ARM performed by Jami Jarrell DO at [address]        Medications:       Prior to Admission medications    Medication Sig Start Date End Date Taking? Authorizing Provider   pantoprazole (PROTONIX) 40 MG tablet Take 1 tablet by mouth every morning (before breakfast) 9/24/21  Yes Manuel Cason APRN - CNP   famotidine (PEPCID) 40 MG tablet Take 1 tablet by mouth every evening 9/24/21  Yes Manuel Cason APRN - CNP   Multiple Vitamins-Minerals (THERAPEUTIC MULTIVITAMIN-MINERALS) tablet Take 1 tablet by mouth daily   Yes Historical Provider, MD        Allergies:       Asa [aspirin] and Chocolate    Social History:     Tobacco:    reports that he has been smoking cigarettes. He has a 5.00 pack-year smoking history. He has never used smokeless tobacco.  Alcohol:      reports current alcohol use of about 5.8 standard drinks of alcohol per week. Drug Use:  reports no history of drug use.     Family History:     Family History   Problem Relation Age of Onset    Hypertension Mother     Stroke Mother     High Blood Pressure Mother     Diabetes Father     Hypertension Father     High Blood Pressure Father     Heart Disease Father        Review of Systems:     Positive and Negative as described in HPI    Review of Systems   Constitutional: Negative for fatigue, malaise/fatigue and weight loss. HENT: Negative for hoarse voice and sore throat. Eyes: Negative for blurred vision. Respiratory: Negative for cough, choking, shortness of breath and wheezing. Cardiovascular: Negative for chest pain, palpitations, orthopnea and PND. Gastrointestinal: Positive for heartburn. Negative for abdominal pain, dysphagia, melena and nausea. Musculoskeletal: Negative for muscle weakness and neck pain. Neurological: Negative for headaches. Physical Exam:   Vitals:  /70 (Position: Sitting)   Pulse 72   Temp 97.5 °F (36.4 °C) (Temporal)   Resp 20   Wt 198 lb 6.4 oz (90 kg)   SpO2 98%   BMI 29.30 kg/m²     Physical Exam    Data:     Lab Results   Component Value Date     11/07/2020    K 3.9 11/07/2020     11/07/2020    CO2 23 11/07/2020    BUN 14 11/07/2020    CREATININE 0.60 11/07/2020    GLUCOSE 122 11/07/2020    PROT 5.9 11/07/2020    LABALBU 3.2 11/07/2020    BILITOT 0.29 11/07/2020    ALKPHOS 80 11/07/2020    AST 55 11/07/2020    ALT 78 11/07/2020     Lab Results   Component Value Date    WBC 8.6 11/07/2020    RBC 3.10 11/07/2020    HGB 10.5 11/07/2020    HCT 31.3 11/07/2020    .0 11/07/2020    MCH 33.9 11/07/2020    MCHC 33.5 11/07/2020    RDW 16.3 11/07/2020     11/07/2020    MPV 10.1 11/07/2020     Lab Results   Component Value Date    TSH 1.51 06/29/2020     Lab Results   Component Value Date    CHOL 267 06/29/2020    HDL 99 06/29/2020       Assessment/Plan:      Diagnosis Orders   1. Gastroesophageal reflux disease without esophagitis  pantoprazole (PROTONIX) 40 MG tablet    famotidine (PEPCID) 40 MG tablet     Continue all current medications. Continue surveillance with oncology as planned. We will see him back in 6 months, sooner if any problems.       1Alex Walsh received counseling on the following healthy behaviors: nutrition, exercise and medication adherence  2. Patient given educational materials - see patient instructions  3. Was a self-tracking handout given in paper form or via [employer]? No  If yes, see orders or list here. 4.  Discussed use, benefit, and side effects of prescribed medications. Barriers to medication compliance addressed. All patient questions answered. Pt voiced understanding. 5.  Reviewed prior labs and health maintenance  6. Continue current medications, diet and exercise. Completed Refills   Requested Prescriptions     Signed Prescriptions Disp Refills    pantoprazole (PROTONIX) 40 MG tablet 90 tablet 3     Sig: Take 1 tablet by mouth every morning (before breakfast)    famotidine (PEPCID) 40 MG tablet 90 tablet 3     Sig: Take 1 tablet by mouth every evening         Return in about 6 months (around 3/24/2022) for Check up. Alone
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
## 2024-12-07 NOTE — BH INPATIENT PSYCHIATRY ASSESSMENT NOTE - HPI (INCLUDE ILLNESS QUALITY, SEVERITY, DURATION, TIMING, CONTEXT, MODIFYING FACTORS, ASSOCIATED SIGNS AND SYMPTOMS)
Pt is a 33 y/o single M, no PMH/PPH, recently lost his job and housing after a 4 month period in the housing court in the context of falling behind in paying rent, has been at the th [facility] shelter, presented to the ER with SI with intent and [place] after finding that his apartment was already locked and lost access to all his items including his social security card.    From IPP:    From ED:    Pt reports that he has had SI since going to his apartment yesterday and finding it was locked. He said he still feels suicidal but he did not attempt as still has some will to live but presents ambivalent. Has never been in this situation before. Describes his attempt to become a  in the past year but never qualifying due to not meeting their physical/health metrics, after leaving his job at [employer] a year ago. Said he was relying on savings. Then he tried to get a job at [employer] during housing court but his orientation day overlapped with a court date. He said he did not know he could move his court date with a valid reason and missed his orientation to go to court, then lost this job which led to losing in court eventually. He describes significant shame and guilt over losing this apartment, feeling like he disappointed his father as his father passed down this apt to him after moving to [place] (has not informed father for this reason). Reports hopelessness, loneliness, says that he is all by himself in life. Reports feeling down for weeks, anhedonia, sleep is "trash" denies appetite/weight changes. Denies past suicidality or hospitalizations.     Pt presents with elevated suicide risk, agreeable to voluntary hospitalization and ssri trial at the hospital. Risks/benefits/alternatives d/w pt, pt agrees to sertraline. Requires inpatient level of care due to safety risk. Pt is a 35 y/o single man, no PMH/PPH, recently lost his job and housing after a 4 month period in the housing court in the context of falling behind in paying rent, has been at the [address] [place] [address], presented to the ER with SI with intent and plan to jump off the [facility] into the water and drown after finding that his apartment was already locked and lost access to all his items including his social security card.    From IPP:    Nursing reports no acute events overnight. Per chart review the patient has not required any behavioral PRNS since the last assessment.    Chart reviewed, patient seen and evaluated in AM. Patient is calm, cooperative, pleasant, but appears dysthymic and avoids eye contact. Patient reports that he currently feels "overwhelmed." He states that he has felt depressed, hopeless, helpless, and worthless for the past three days in the context of difficulties obtaining a job and being locked out of his apartment with loss of access to all of his items. He notes that he has great financial troubles which have contributed to him going into arrears for the past 4 months. He has been trying to find a job in order to pay rent, however his training days have conflicted with housing court, which in turn have lead him to losing the job. The loss of job lead to him being unable to pay rent, and now he has been locked out of his house. He states that he has minimal social supports- he names two friends, both which are busy with their own lives, and a father who lives in [place] but he does not want to ask him for help because he does not want to burden him. The patient notes a lot of shame and guilt feelings around being a burden, as well as not living up to his idealized conceptions of masculinity. He states that prior to presenting to the ED he felt so depressed that he seriously contemplated jumping into the river from the [facility], thinking that he would drown because he does not know how to swim. He states that he decided to go to the ED instead because he still had dreams of making it big financially and having a child.  Patient endorsed OK sleep and appetite.     Patient denies any current or history of symptoms consistent with a diagnosis of jimena, PTSD, or anxiety. Patient denies any current SI on interview.    From ED:    Pt reports that he has had SI since going to his apartment yesterday and finding it was locked. He said he still feels suicidal but he did not attempt as still has some will to live but presents ambivalent. Has never been in this situation before. Describes his attempt to become a  in the past year but never qualifying due to not meeting their physical/health metrics, after leaving his job at [employer] a year ago. Said he was relying on savings. Then he tried to get a job at [employer] during housing court but his orientation day overlapped with a court date. He said he did not know he could move his court date with a valid reason and missed his orientation to go to court, then lost this job which led to losing in court eventually. He describes significant shame and guilt over losing this apartment, feeling like he disappointed his father as his father passed down this apt to him after moving to [place] (has not informed father for this reason). Reports hopelessness, loneliness, says that he is all by himself in life. Reports feeling down for weeks, anhedonia, sleep is "trash" denies appetite/weight changes. Denies past suicidality or hospitalizations.     Pt presents with elevated suicide risk, agreeable to voluntary hospitalization and ssri trial at the hospital. Risks/benefits/alternatives d/w pt, pt agrees to sertraline. Requires inpatient level of care due to safety risk.

## 2024-12-07 NOTE — BH SAFETY PLAN - THE ONE THING THAT IS MOST IMPORTANT TO ME AND WORTH LIVING FOR IS:
The one thing that is most important to me and worth living for is getting my apartment back, getting a car, my independence and living a good quality of life.

## 2024-12-07 NOTE — BH INPATIENT PSYCHIATRY ASSESSMENT NOTE - NSCOMMENTSUICRISKFACT_PSY_ALL_CORE
Risk factors include patient's hopelessness, helplessness, lack of social supports, current unemployement status, unstable housing

## 2024-12-07 NOTE — BH SAFETY PLAN - WARNING SIGN 1
I have a situation that I am trying so hard to fix and just when I was about to get my new job it fell through and I lost my apartment. I am all alone and my dad lives in [place] with his new wife and daughter. He left me this apartment and I feel like I failed him. I was reluctant to tell him what happened and was trying to deal with it on my own, but I need help. I became suicidal from all the hardships and depression.

## 2024-12-07 NOTE — ED CDU PROVIDER DISPOSITION NOTE - CLINICAL COURSE
Received pt in obs. Here with SI after getting evicted from his apartment. Labs wnl. Medically cleared. I spoke with telepsych attending who recommended voluntary admission to [facility] for SI. He requested UDS which they will f/u inpatient. Pt requires admission for further management of SI given risk for harm to self, psychiatric decompensation, etc if not closely monitored and tx'ed.

## 2024-12-07 NOTE — BH INPATIENT PSYCHIATRY ASSESSMENT NOTE - NSBHCHARTREVIEWVS_PSY_A_CORE FT
Vital Signs Last 24 Hrs  T(C): 36.6 (12-07-24 @ 08:36), Max: 36.8 (12-06-24 @ 19:13)  T(F): 97.8 (12-07-24 @ 08:36), Max: 98.3 (12-06-24 @ 19:13)  HR: 78 (12-07-24 @ 08:36) (74 - 99)  BP: 127/67 (12-07-24 @ 08:36) (98/64 - 127/67)  BP(mean): 79 (12-07-24 @ 00:17) (79 - 79)  RR: 16 (12-07-24 @ 06:20) (16 - 18)  SpO2: 99% (12-07-24 @ 06:20) (98% - 100%)     Vital Signs Last 24 Hrs  T(C): 36.6 (12-07-24 @ 15:57), Max: 36.8 (12-06-24 @ 19:13)  T(F): 97.9 (12-07-24 @ 15:57), Max: 98.3 (12-06-24 @ 19:13)  HR: 68 (12-07-24 @ 15:57) (68 - 99)  BP: 138/97 (12-07-24 @ 15:57) (98/64 - 138/97)  BP(mean): 79 (12-07-24 @ 00:17) (79 - 79)  RR: 16 (12-07-24 @ 06:20) (16 - 18)  SpO2: 99% (12-07-24 @ 06:20) (98% - 100%)

## 2024-12-07 NOTE — ED BEHAVIORAL HEALTH ASSESSMENT NOTE - HPI (INCLUDE ILLNESS QUALITY, SEVERITY, DURATION, TIMING, CONTEXT, MODIFYING FACTORS, ASSOCIATED SIGNS AND SYMPTOMS)
Pt is a 35 y/o single M, no PMH/PPH, recently lost his job and housing after a 4 month period in the housing court in the context of falling behind in paying rent, has been at the th [facility] shelter, presented to the ER with SI with intent and plan to jump [place] the [place] ferry [place] the water after finding that his apartment was already locked and lost access to all his items including his social security card.    Pt reports that he has had SI since going to his apartment yesterday and finding it was locked. He said he still feels suicidal but he did not attempt as still has some will to live but presents ambivalent. Has never been in this situation before. Describes his attempt to become a  in the past year but never qualifying due to not meeting their physical/health metrics, after leaving his job at [employer] a year ago. Said he was relying on savings. Then he tried to get a job at [employer] during housing court but his orientation day overlapped with a court date. He said he did not know he could move his court date with a valid reason and missed his orientation to go to court, then lost this job which led to losing in court eventually. He describes significant shame and guilt over losing this apartment, feeling like he disappointed his father as his father passed down this apt to him after moving to [place] (has not informed father for this reason). Reports hopelessness, loneliness, says that he is all by himself in life. Reports feeling down for weeks, anhedonia, sleep is "trash" denies appetite/weight changes. Denies past suicidality or hospitalizations.     Pt presents with elevated suicide risk, agreeable to voluntary hospitalization and ssri trial at the hospital. Requires inpatient level of care due to safety risk. Pt is a 35 y/o single M, no PMH/PPH, recently lost his job and housing after a 4 month period in the housing court in the context of falling behind in paying rent, has been at the [address] [place] [address] shelter, presented to the ER with SI with intent and plan to jump off the [place] ferry into the water after finding that his apartment was already locked and lost access to all his items including his social security card.    Pt reports that he has had SI since going to his apartment yesterday and finding it was locked. He said he still feels suicidal but he did not attempt as still has some will to live but presents ambivalent. Has never been in this situation before. Describes his attempt to become a  in the past year but never qualifying due to not meeting their physical/health metrics, after leaving his job at [employer] a year ago. Said he was relying on savings. Then he tried to get a job at [employer] during housing court but his orientation day overlapped with a court date. He said he did not know he could move his court date with a valid reason and missed his orientation to go to court, then lost this job which led to losing in court eventually. He describes significant shame and guilt over losing this apartment, feeling like he disappointed his father as his father passed down this apt to him after moving to [place] (has not informed father for this reason). Reports hopelessness, loneliness, says that he is all by himself in life. Reports feeling down for weeks, anhedonia, sleep is "trash" denies appetite/weight changes. Denies past suicidality or hospitalizations.     Pt presents with elevated suicide risk, agreeable to voluntary hospitalization and ssri trial at the hospital. Risks/benefits/alternatives d/w pt, pt agrees to sertraline. Requires inpatient level of care due to safety risk.

## 2024-12-07 NOTE — ED BEHAVIORAL HEALTH ASSESSMENT NOTE - ABORTED (SELF-INTERRUPTED) ATTEMPT:
Subjective   42-year-old female 2 days status post endoscopy colonoscopy with the GI service, Dr. Colby.  Patient notes going home and having intractable nausea vomiting with diarrhea.  Patient has had increasing abdominal pain and the ability to tolerate p.o.  Patient initially thought she could work through this though it has been gradually worsening now with diffuse muscle cramps across her body.  Patient is felt warm though no objective fevers.  No blood in the vomit or stools.            Review of Systems   Constitutional: Positive for activity change, appetite change and fatigue. Negative for chills and fever.   HENT: Negative.  Negative for congestion.    Eyes: Negative.  Negative for photophobia and visual disturbance.   Respiratory: Negative.  Negative for cough, chest tightness and shortness of breath.    Cardiovascular: Negative.  Negative for chest pain and palpitations.   Gastrointestinal: Positive for abdominal pain, diarrhea, nausea and vomiting. Negative for constipation.   Endocrine: Negative.    Genitourinary: Positive for decreased urine volume. Negative for dysuria, flank pain and hematuria.   Musculoskeletal: Negative.  Negative for arthralgias, back pain, myalgias, neck pain and neck stiffness.   Skin: Negative.  Negative for pallor.   Neurological: Positive for weakness and light-headedness. Negative for dizziness, syncope, numbness and headaches.   Psychiatric/Behavioral: Negative.  Negative for confusion and suicidal ideas. The patient is not nervous/anxious.    All other systems reviewed and are negative.      Past Medical History:   Diagnosis Date   • Abdominal pain     right lower quadrant      • Allergy     seasonal   • Conjunctivitis    • Encounter for routine gynecological examination    • Gastroesophageal reflux disease    • Irritable bowel syndrome with diarrhea    • Pigmented skin lesion    • PONV (postoperative nausea and vomiting)    • Screening examination for venereal disease   "  • Viral gastroenteritis        Allergies   Allergen Reactions   • Adhesive Tape Other (See Comments)     blisters   • Codeine      UNKNOWN REACTION   • Demerol [Meperidine] Mental Status Change   • Vicodin [Hydrocodone-Acetaminophen] Other (See Comments)     \"non functional\"       Past Surgical History:   Procedure Laterality Date   •  SECTION  2007    (1) - Primary low transverse C section   • CHOLECYSTECTOMY  2005   • COLONOSCOPY N/A 2019    Procedure: COLONOSCOPY;  Surgeon: Myles Ferraro MD;  Location: [facility] ENDOSCOPY;  Service: Gastroenterology   • ENDOSCOPY N/A 2019    Procedure: ESOPHAGOGASTRODUODENOSCOPY;  Surgeon: Myles Ferraro MD;  Location: [facility] ENDOSCOPY;  Service: Gastroenterology   • INJECTION OF MEDICATION  2015    Zofran (1) - TRINA Whittington   • OTHER SURGICAL HISTORY       Foot/toes surgery procedure (1)  -  x 2   • OTHER SURGICAL HISTORY  2007    EXAM OF CERVIX W/SCOPE 24110 (1) - mild dysplasia(JOANIE I) Squamoglandular mucosa. Chronic cervicitis. Curettings, Endocervix: unremarkable columnar cell epithelium   • OTHER SURGICAL HISTORY  2016    Biopsy, Each Additional Lesion 36531 (1) - LONNIE Maldonado   • OTHER SURGICAL HISTORY  2010    Remove Impacted Cerumen 02238 (1) - KAlonzo Eaton   • PAP SMEAR  10/15/2007    (1) - Epithelial cell abnormality: squamous cells, Atypical squamouscells of undetermined significance. cannot exclude HSIL/(ASC-H)    • SKIN BIOPSY  2016    Biopsy of Skin 08800 (1) - LONNIE Maldonado   • THUMB CARPOMETACARPAL JOINT ARTHROPLASTY FLEXOR CARPI RADIALIS TENDON Right 2017    Procedure: THUMB CARPOMETACARPAL JOINT ARTHROPLASTY FLEXOR CARPI RADIALIS TENDON RELEASE  EXCISION OF GANGLLION AND RELEASE OF A1 PULLEY WRIST EXTENSOR DECREASED VEINS DISEASE;  Surgeon: Kolton Emery MD;  Location: [facility] OR;  Service:        Family History   Problem Relation Age of Onset   • Diabetes Father    • Prostate cancer Father    • Colon cancer Father  "       Social History     Socioeconomic History   • Marital status:      Spouse name: Not on file   • Number of children: Not on file   • Years of education: Not on file   • Highest education level: Not on file   Tobacco Use   • Smoking status: Current Every Day Smoker     Packs/day: 0.75     Years: 25.00     Pack years: 18.75     Types: Cigarettes     Start date: 1/17/1990   • Smokeless tobacco: Never Used   • Tobacco comment: Current Smoker -    Substance and Sexual Activity   • Alcohol use: No   • Drug use: No   • Sexual activity: Yes     Partners: Male     Birth control/protection: Surgical     Comment: Vasectomy           Objective   Physical Exam   Constitutional: She is oriented to person, place, and time. She appears well-developed and well-nourished. She appears distressed.   HENT:   Head: Normocephalic and atraumatic.   Nose: Nose normal.   Dry mucous membranes.   Eyes: Conjunctivae and EOM are normal. No scleral icterus.   Neck: Normal range of motion. Neck supple. No JVD present.   Cardiovascular: Normal rate, regular rhythm, normal heart sounds and intact distal pulses. Exam reveals no gallop and no friction rub.   No murmur heard.  Pulmonary/Chest: Effort normal. No respiratory distress. She has no wheezes. She has no rales. She exhibits no tenderness.   Abdominal: Soft. She exhibits distension. She exhibits no mass. There is tenderness. There is no rebound and no guarding.   Diffuse abdominal tenderness upper greater than lower quadrants with mild distention.   Musculoskeletal: Normal range of motion. She exhibits no edema, tenderness or deformity.   Lymphadenopathy:     She has no cervical adenopathy.   Neurological: She is alert and oriented to person, place, and time. No cranial nerve deficit. She exhibits normal muscle tone.   Skin: Skin is warm and dry. Capillary refill takes less than 2 seconds. No rash noted. She is not diaphoretic. No erythema. No pallor.   Psychiatric: She has a normal  mood and affect. Her behavior is normal. Judgment and thought content normal.   Nursing note and vitals reviewed.      Procedures           ED Course        Labs Reviewed   COMPREHENSIVE METABOLIC PANEL - Abnormal; Notable for the following components:       Result Value    Glucose 126 (*)     Creatinine 1.16 (*)     Sodium 135 (*)     Potassium 3.4 (*)     CO2 20.0 (*)     eGFR Non  Amer 51 (*)     Anion Gap 17.0 (*)     All other components within normal limits    Narrative:     GFR Normal >60  Chronic Kidney Disease <60  Kidney Failure <15   URINALYSIS W/ MICROSCOPIC IF INDICATED (NO CULTURE) - Abnormal; Notable for the following components:    Appearance, UA Cloudy (*)     Specific Gravity, UA 1.037 (*)     Ketones, UA 15 mg/dL (1+) (*)     Bilirubin, UA Large (3+) (*)     Blood, UA Small (1+) (*)     Protein,  mg/dL (2+) (*)     All other components within normal limits   CBC WITH AUTO DIFFERENTIAL - Abnormal; Notable for the following components:    WBC 21.92 (*)     RBC 5.43 (*)     MCV 77.5 (*)     MCH 25.8 (*)     RDW 15.7 (*)     Platelets 499 (*)     Neutrophil % 78.5 (*)     Lymphocyte % 11.5 (*)     Neutrophils, Absolute 17.19 (*)     Monocytes, Absolute 1.92 (*)     Immature Grans, Absolute 0.11 (*)     All other components within normal limits   URINALYSIS, MICROSCOPIC ONLY - Abnormal; Notable for the following components:    RBC, UA 6-12 (*)     WBC, UA 6-12 (*)     Bacteria, UA 2+ (*)     Squamous Epithelial Cells, UA 6-12 (*)     Mucus, UA Small/1+ (*)     All other components within normal limits   LIPASE - Normal   HCG, SERUM, QUALITATIVE - Normal   CK - Normal   GASTROINTESTINAL PANEL, PCR   RAINBOW DRAW    Narrative:     The following orders were created for panel order [place] Draw.  Procedure                               Abnormality         Status                     ---------                               -----------         ------                     Light Blue Top[[id number]]                                    Final result               Green Top (Gel)[[id number]]                                  Final result               Lavender Top[[id number]]                                     Final result               Gold Top - SST[[id number]]                                                                Please view results for these tests on the individual orders.   CBC AND DIFFERENTIAL    Narrative:     The following orders were created for panel order CBC & Differential.  Procedure                               Abnormality         Status                     ---------                               -----------         ------                     CBC Auto Differential[[id number]]        Abnormal            Final result                 Please view results for these tests on the individual orders.   LIGHT BLUE TOP   GREEN TOP   LAVENDER TOP   GOLD TOP - SST       CT Abdomen Pelvis Without Contrast   Final Result   CONCLUSION:    1.  Dilated loops of left upper quadrant small bowel with   air-fluid levels.   2.  Bowel wall thickening and hypervascular mesentery in the   right lower quadrant jejunum.   3.  Slightly enlarged mesenteric lymph nodes in the right   midabdomen with surrounding inflammation and hypervascularity.   4.  This constellation of findings suggests the possibility of   Crohn's disease.   5.  Given the lymph node enlargement and surrounding fat   stranding in the mesentery, a neoplastic process such as lymphoma   although less likely could also be considered.   6.  Incidental note is made of a Meckel's diverticulum arising   from the distal ileum in the right lower quadrant. The   diverticulum measures approximately 2.8 cm in length and 1.8 cm   in diameter.      Electronically signed by:  Joey Ma MD  9/19/2019 4:16 PM CDT   Workstation: [id number]      XR Abdomen Flat & Upright   Final Result   CONCLUSION:     Multiple prominent loops of bowel in the upper abdomen are   nonspecific,  cannot exclude small bowel obstruction.      Electronically signed by:  Joey Ma MD  9/19/2019 2:58 PM CDT   Workstation: [id number]        Bowel wall thickening with intra-abdominal inflammation consistent with probable infection in the setting of the patient's leukocytosis.  Patient with mild acute kidney injury in the setting of dehydration.  Patient not tolerant to p.o. at this time patient will be admitted for fluids Zosyn and further GI evaluation.  Case discussed with Dr. Tran.          MDM    Final diagnoses:   Bowel wall thickening   Nonspecific mesenteric adenitis   Nausea vomiting and diarrhea   BECCA (acute kidney injury) (CMS/HCC)   Dehydration, moderate              Veto Grady MD  09/19/19 [id number]     None known

## 2024-12-07 NOTE — BH INPATIENT PSYCHIATRY ASSESSMENT NOTE - NSICDXBHTERTIARYDX_PSY_ALL_CORE
R/O Current severe episode of major depressive disorder without psychotic features without prior episode   F32.2

## 2024-12-07 NOTE — BH INPATIENT PSYCHIATRY ASSESSMENT NOTE - NSBHATTESTCOMMENTATTENDFT_PSY_A_CORE
Interviewed patient with the resident Dr. Thurman. Patient reports depressed mood with recent SI in context of significant social and financial stress and limited support. Agree with the assessment and plan.

## 2024-12-07 NOTE — BH SAFETY PLAN - ENVIRONMENT SAFETY 1:
I feel my environment would be safer if I could get my apartment back, get my [place] job in place and get a small car.

## 2024-12-07 NOTE — ED CDU PROVIDER INITIAL DAY NOTE - OBJECTIVE STATEMENT
34-year-old male no reported past medical history presents to the ED for evaluation for suicidal ideations.  Patient with several days of suicidal ideations.  Patient reports that these thoughts were triggered after he was evicted from his apartment, reports that he feels as though he let his father down.  Has also been struggling to obtain his ideal job at the [employer].  States that he feels like a loser and that he should just give it up.  His plan would be to jump [place] the [employer] [employer] and drown as he cannot swim.  Denies any visual or auditory hallucinations, paranoia.

## 2024-12-07 NOTE — ED BEHAVIORAL HEALTH NOTE - BEHAVIORAL HEALTH NOTE
===================     PRE-HOSPITAL COURSE     ==================     SOURCE: EHR     DETAILS: Biblklarissa, Complaint- SI     ============     ED COURSE      ============      SOURCE:  EHR review, SLICK Small    ARRIVAL: [employer]    BELONGINGS:      None notable, stowed away.       BEHAVIOR: Patient presented to ED due to SI.  Per RN, patient is calm, cooperative, and under behavioral control. Patient has good hygiene, no cuts or bruises. Patient is orientedx4, makes good eye contact, normal thoughts and speech.     TREATMENT: Patient received no medications.      VISITORS: Patient is unaccompanied at this time.      ------------------------------------------------

## 2024-12-07 NOTE — BH PATIENT PROFILE - NSELOPEMENTRISK_PSY_ALL_CORE
Onset: 8/12/19 4 pm    Location/description: Patient was eating cherry pits and accidentally ate 8 horner pits.  He did not chew the pits, he swallowed them whole.      No difficulty swallowing or breathing.  Denies abdominal pain.    Input and Output: Normal    Activity level: Normal    Temperature (route and time): Denies    Weight:     Wt Readings from Last 1 Encounters:   06/27/18 29.3 kg (96 %, Z= 1.79)*     * Growth percentiles are based on CDC 2-20 Years data.        Recent Care: 6/27/18 Dermatology    Home care advice given per protocol, Dad verbalized understanding.  Instructed to call back with any abdominal or rectal pain when passing stool.      Reason for Disposition  • Harmless swallowed FB and no symptoms (all triage questions negative)    Protocols used: SWALLOWED FOREIGN BODY-P-AH       No

## 2024-12-07 NOTE — BH INPATIENT PSYCHIATRY ASSESSMENT NOTE - CURRENT MEDICATION
MEDICATIONS  (STANDING):  influenza   Vaccine 0.5 milliLiter(s) IntraMuscular once  sertraline 50 milliGRAM(s) Oral daily    MEDICATIONS  (PRN):  haloperidol     Tablet 5 milliGRAM(s) Oral every 8 hours PRN agitation  LORazepam     Tablet 2 milliGRAM(s) Oral every 8 hours PRN Anxiety

## 2024-12-08 RX ADMIN — SERTRALINE HYDROCHLORIDE 50 MILLIGRAM(S): 100 TABLET, FILM COATED ORAL at 08:24

## 2024-12-08 NOTE — BH INPATIENT PSYCHIATRY PROGRESS NOTE - NSBHCHARTREVIEWVS_PSY_A_CORE FT
Vital Signs Last 24 Hrs  T(C): 36.6 (12-08-24 @ 08:11), Max: 36.6 (12-07-24 @ 15:57)  T(F): 97.9 (12-08-24 @ 08:11), Max: 97.9 (12-07-24 @ 15:57)  HR: 63 (12-08-24 @ 08:11) (63 - 68)  BP: 136/74 (12-08-24 @ 08:11) (136/74 - 138/97)  BP(mean): --  RR: --  SpO2: --

## 2024-12-08 NOTE — BH INPATIENT PSYCHIATRY PROGRESS NOTE - NSICDXBHSECONDARYDX_PSY_ALL_CORE
Current severe episode of major depressive disorder without psychotic features without prior episode   F32.2  Acute adjustment disorder   F43.20

## 2024-12-08 NOTE — BH INPATIENT PSYCHIATRY PROGRESS NOTE - NSBHMETABOLIC_PSY_ALL_CORE_FT
BMI: BMI (kg/m2): 36.6 (12-07-24 @ 06:20)  HbA1c:   Glucose:   BP: 136/74 (12-08-24 @ 08:11) (98/64 - 138/97)Vital Signs Last 24 Hrs  T(C): 36.6 (12-08-24 @ 08:11), Max: 36.6 (12-07-24 @ 15:57)  T(F): 97.9 (12-08-24 @ 08:11), Max: 97.9 (12-07-24 @ 15:57)  HR: 63 (12-08-24 @ 08:11) (63 - 68)  BP: 136/74 (12-08-24 @ 08:11) (136/74 - 138/97)  BP(mean): --  RR: --  SpO2: --      Lipid Panel:

## 2024-12-08 NOTE — BH INPATIENT PSYCHIATRY PROGRESS NOTE - PRN MEDS
MEDICATIONS  (PRN):  haloperidol     Tablet 5 milliGRAM(s) Oral every 8 hours PRN agitation  LORazepam     Tablet 2 milliGRAM(s) Oral every 8 hours PRN Anxiety

## 2024-12-08 NOTE — BH INPATIENT PSYCHIATRY PROGRESS NOTE - NSBHFUPINTERVALHXFT_PSY_A_CORE
Chart reviewed , discussed with staff and patient  evaluated by his bed side. No acute event overnight. He was sleeping and woke up.   He reports that  he is going trough his personal stress. lost his job  and apartment.  He  currently denies  suicidal ideations intent or plan. He has chronic  intermittent suicidal ideations in the context od his psychosocial stress.   He is mostly  stays in his room.  He is compliant with medications . He denies sleep , or appetite problem    denies a/v hallucinations

## 2024-12-08 NOTE — BH INPATIENT PSYCHIATRY PROGRESS NOTE - NSBHCHARTREVIEWVS_PSY_A_CORE FT
normal sinus rhythm Vital Signs Last 24 Hrs  T(C): 36.6 (12-08-24 @ 08:11), Max: 36.6 (12-07-24 @ 15:57)  T(F): 97.9 (12-08-24 @ 08:11), Max: 97.9 (12-07-24 @ 15:57)  HR: 63 (12-08-24 @ 08:11) (63 - 68)  BP: 136/74 (12-08-24 @ 08:11) (136/74 - 138/97)  BP(mean): --  RR: --  SpO2: --

## 2024-12-09 PROCEDURE — 99231 SBSQ HOSP IP/OBS SF/LOW 25: CPT

## 2024-12-09 RX ORDER — DIPHENHYDRAMINE HCL 25 MG
50 CAPSULE ORAL EVERY 6 HOURS
Refills: 0 | Status: DISCONTINUED | OUTPATIENT
Start: 2024-12-09 | End: 2024-12-13

## 2024-12-09 RX ORDER — HYDROXYZINE HYDROCHLORIDE 25 MG/1
50 TABLET, FILM COATED ORAL EVERY 6 HOURS
Refills: 0 | Status: DISCONTINUED | OUTPATIENT
Start: 2024-12-09 | End: 2024-12-13

## 2024-12-09 RX ADMIN — SERTRALINE HYDROCHLORIDE 50 MILLIGRAM(S): 100 TABLET, FILM COATED ORAL at 08:22

## 2024-12-09 NOTE — BH INPATIENT PSYCHIATRY PROGRESS NOTE - NSICDXBHPRIMARYDX_PSY_ALL_CORE
Depression, reactive   F32.9   Current severe episode of major depressive disorder without psychotic features without prior episode   F32.2

## 2024-12-09 NOTE — BH TREATMENT PLAN - NSTXPLANTHERAPYSESSIONSFT_PSY_ALL_CORE
12-09-24  Type of therapy: Coping skills, Creative arts therapy, Inspiration and motiviation, Leisure development, Self esteem, Social skills training, Stress management, Symptom management  Type of session: Individual  Level of patient participation: Resistance to participation  Duration of participation: Less than 15 minutes  Therapy conducted by: Psych rehab  Therapy Summary: Writer observed pt to be in bed this morning with the covers over his head and the lights off in the room. Pt will need increased encouragement to be out of room and attend RT sessions when depressed mood slightly improves

## 2024-12-09 NOTE — BH SOCIAL WORK INITIAL PSYCHOSOCIAL EVALUATION - NSBHFINANCE_PSY_ALL_CORE
[facility] Hospitalist Progress Note    Date of Service: 2018    Chief Complaint  79 y.o. male hx of Parkinsons dz admitted 2018 with GLF w syncope. Dx Mobitz type II Hb    Interval Problem Update    Tele 2nd degree block hr 50-70s,-  Plan pacemaker  Hypotensive at times. Asympt.    Consultants/Specialty  Dr Burger    Disposition  TBD, PT /OT eval        Review of Systems   Constitutional: Negative for chills, diaphoresis, fever and malaise/fatigue.   HENT: Negative for congestion.    Eyes: Negative for discharge and redness.   Respiratory: Negative for cough, shortness of breath, wheezing and stridor.    Cardiovascular: Negative for chest pain, palpitations and leg swelling.   Gastrointestinal: Negative for abdominal pain, diarrhea and vomiting.   Genitourinary: Negative for flank pain and hematuria.   Musculoskeletal: Positive for falls. Negative for back pain, joint pain and neck pain.   Neurological: Negative for tremors, sensory change, speech change, focal weakness and weakness.   Psychiatric/Behavioral: Negative for hallucinations and substance abuse.      Physical Exam  Laboratory/Imaging   Hemodynamics  Temp (24hrs), Av.9 °C (98.4 °F), Min:36.7 °C (98 °F), Max:37.1 °C (98.8 °F)   Temperature: 37.1 °C (98.8 °F)  Pulse  Av.1  Min: 52  Max: 73 Heart Rate (Monitored): (!) 53  Blood Pressure : (!) 95/52 (RN notified), NIBP: (!) 90/50      Respiratory      Respiration: 18, Pulse Oximetry: 96 %        RUL Breath Sounds: Clear, RML Breath Sounds: Clear;Diminished, RLL Breath Sounds: Diminished, EMELYN Breath Sounds: Clear, LLL Breath Sounds: Diminished    Fluids    Intake/Output Summary (Last 24 hours) at 18 1231  Last data filed at 18 1009   Gross per 24 hour   Intake                0 ml   Output             1175 ml   Net            -1175 ml       Nutrition  Orders Placed This Encounter   Procedures   • Diet Order     Standing Status:   Standing     Number of Occurrences:   1     Order  Specific Question:   Diet:     Answer:   Cardiac [6]     Physical Exam   Constitutional: He is oriented to person, place, and time. No distress.   HENT:   Head: Normocephalic and atraumatic.   Nose: Nose normal.   Eyes: Conjunctivae and EOM are normal. No scleral icterus.   Neck: Normal range of motion. No JVD present.   Cardiovascular:   No murmur heard.  Bradycardic, regular.   Pulmonary/Chest: Effort normal. No stridor. No respiratory distress. He has no wheezes. He has no rales.   Abdominal: Soft. Bowel sounds are normal. He exhibits no distension. There is no tenderness.   Musculoskeletal: He exhibits no edema or tenderness.   Neurological: He is alert and oriented to person, place, and time. No cranial nerve deficit.   Skin: Skin is warm and dry. He is not diaphoretic. No pallor.   Laceration, bruise rt brown w mild swelling   Psychiatric: He has a normal mood and affect. His behavior is normal.   Vitals reviewed.      Recent Labs      01/06/18 1929 01/07/18 0219   WBC  4.7*  8.1   RBC  4.66*  4.03*   HEMOGLOBIN  14.9  12.7*   HEMATOCRIT  41.6*  36.5*   MCV  89.3  90.6   MCH  32.0  31.5   MCHC  35.8*  34.8   RDW  42.1  42.3   PLATELETCT  219  212   MPV  11.0  11.6     Recent Labs      01/06/18 1929 01/07/18 0219   SODIUM  139  142   POTASSIUM  4.0  4.3   CHLORIDE  107  112   CO2  25  25   GLUCOSE  120*  118*   BUN  29*  28*   CREATININE  1.25  1.16   CALCIUM  9.7  9.3     Recent Labs      01/06/18 1929   APTT  28.9   INR  1.09     Recent Labs      01/06/18 1929   BNPBTYPENAT  124*              Assessment/Plan     * Mobitz type 2 second degree heart block   Assessment & Plan    Post fall, syncope  Plan pacer.  Avoid may blocking agents.        Syncope   Assessment & Plan    Secondary to above  Plan pacer  Tele monitoring  PT/OT cardiac issues  stable.        Parkinson disease (CMS-[facility])- (present on admission)   Assessment & Plan    This is chronic - stable neuro  cw Sinemet, Aricept, and  pramixepole.  Fall precautions          Quality-Core Measures       Include Z78.9 (Other Specified Conditions Influencing Health Status) As An Associated Diagnosis?: No Financial difficulties/No source of income

## 2024-12-09 NOTE — BH INPATIENT PSYCHIATRY PROGRESS NOTE - NSBHMETABOLIC_PSY_ALL_CORE_FT
BMI: BMI (kg/m2): 36.6 (12-07-24 @ 06:20)  HbA1c:   Glucose:   BP: 120/78 (12-09-24 @ 07:51) (98/64 - 148/88)Vital Signs Last 24 Hrs  T(C): 37 (12-09-24 @ 07:51), Max: 37.1 (12-08-24 @ 16:08)  T(F): 98.6 (12-09-24 @ 07:51), Max: 98.8 (12-08-24 @ 16:08)  HR: 70 (12-09-24 @ 07:51) (70 - 88)  BP: 120/78 (12-09-24 @ 07:51) (120/78 - 148/88)  BP(mean): --  RR: --  SpO2: --      Lipid Panel:  BMI: BMI (kg/m2): 36.6 (12-07-24 @ 06:20)  HbA1c:   Glucose:   BP: 135/76 (12-09-24 @ 15:47) (120/78 - 148/88)Vital Signs Last 24 Hrs  T(C): 36.6 (12-09-24 @ 15:47), Max: 37 (12-09-24 @ 07:51)  T(F): 97.8 (12-09-24 @ 15:47), Max: 98.6 (12-09-24 @ 07:51)  HR: 70 (12-09-24 @ 15:47) (70 - 70)  BP: 135/76 (12-09-24 @ 15:47) (120/78 - 135/76)  BP(mean): --  RR: --  SpO2: --      Lipid Panel:

## 2024-12-09 NOTE — BH INPATIENT PSYCHIATRY PROGRESS NOTE - NSICDXBHSECONDARYDX_PSY_ALL_CORE
Current severe episode of major depressive disorder without psychotic features without prior episode   F32.2  Acute adjustment disorder   F43.20   Acute adjustment disorder   F43.20

## 2024-12-09 NOTE — BH SOCIAL WORK INITIAL PSYCHOSOCIAL EVALUATION - NSBHHOUSECOMMENTFT_PSY_ALL_CORE
Sajanghazal reports he recently lost his job and housing [place] in the context of falling behind in paying rent. Per chart, patient has been at [address] [facility]. Per patient, he was street homeless prior to presenting to the hospital.

## 2024-12-09 NOTE — BH TREATMENT PLAN - NSTXDEPRESINTERPR_PSY_ALL_CORE
WA will offer support and provide encouragement to attend RT sessions to develop coping skills and leisure interests while on the unit

## 2024-12-09 NOTE — BH SOCIAL WORK INITIAL PSYCHOSOCIAL EVALUATION - NSBHLEGALALTERNATE_PSY_ALL_CORE
Hanging in there but feeling less certain about ASHLIE; desires possible RLTCS if not delivered by 40plus weeks
No

## 2024-12-09 NOTE — BH INPATIENT PSYCHIATRY PROGRESS NOTE - NSBHFUPINTERVALHXFT_PSY_A_CORE
Patient seen and evaluated 12/9/25 with Attending Psychiatrist Dr. Giraldo. As per nursing report no acute events. On approach patient calm and cooperative. No agitation or aggression noted. Patient expresses feeling better. Denies any suicidal/homicidal ideations. Verbalizes having time to think about his situation and come up with a plan to get his situation resolved. Patients’ depression and SI appear to stem form psychosocial stressors of the recent loss of his job and housing. Patient recently started on Zoloft. Denies any side effects/adverse reactions. No side effects/adverse reactions noted. Patient isolative to his room. Encouraged to get out of his room, engage with peers and attend groups. Patient seen and evaluated 12/9/24 with Attending Psychiatrist Dr. Giraldo. As per nursing report no acute events. On approach patient calm and cooperative. No agitation or aggression noted. Patient expresses feeling better. Denies any suicidal/homicidal ideations. Verbalizes having time to think about his situation and come up with a plan to get his situation resolved. Patients’ depression and SI appear to stem form psychosocial stressors of the recent loss of his job and housing. Patient recently started on Zoloft. Denies any side effects/adverse reactions. No side effects/adverse reactions noted. Patient isolative to his room. Encouraged to get out of his room, engage with peers and attend groups.
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
## 2024-12-09 NOTE — BH INPATIENT PSYCHIATRY PROGRESS NOTE - NSBHASSESSSUMMFT_PSY_ALL_CORE
Patient seen and evaluated 12/9/25 with Attending Psychiatrist Dr. Giraldo. As per nursing report no acute events. On approach patient calm and cooperative. No agitation or aggression noted. Patient expresses feeling better. Denies any suicidal/homicidal ideations. Verbalizes having time to think about his situation and come up with a plan to get his situation resolved. Patients’ depression and SI appear to stem form psychosocial stressors of the recent loss of his job and housing. Patient recently started on Zoloft. Denies any side effects/adverse reactions. No side effects/adverse reactions noted. Patient isolative to his room. Encouraged to get out of his room, engage with peers and attend groups. Pt is a 33 y/o single man, no PMH/PPH, recently lost his job and housing after a [place] period [place] the housing court in the context of falling behind in paying rent, has been at the [facility] [facility], presented to the ER with SI with intent and plan to jump off the [place] into the water and drown after finding that his apartment was already locked and lost access to all his items including his social security card.    Patient seen and evaluated 12/9/25 with Attending Psychiatrist Dr. Giraldo. Patient expresses feeling better. Denies any suicidal/homicidal ideations. Verbalizes having time to think about his situation and come up with a plan to get his situation resolved. Patients’ depression and SI appear to stem form psychosocial stressors of the recent loss of his job and housing. Patient recently started on Zoloft.     #Admit    #Plan 12/9/24    #MDD  -Zoloft 50mg daily    -Hydroxyzine 50mg Q6 PRN for anxiety or insomnia  -Haldol 5mg Q8 PRN for agitation or psychosis  -Benadryl 50mg Q8 PRN for EPS  -Lorazepam 2mg Q8 PRN for aggression    #Agitation  -for agitation not amenable to verbal redirection, may give haldol 5 mg q6h prn, ativan 2 mg q6h prn, benadryl 50 mg q6h prn with escalation to IM if pt is a danger to self or/and others with repeat EKG to ensure QTc <500 ms.     Pt is a 33 y/o single man, no PMH/PPH, recently lost his job and housing after a 4 [place] period [place] the housing court in the context of falling behind in paying rent, has been at the [facility], presented to the ER with SI with intent and plan to jump off the [place] into the water and drown after finding that his apartment was already locked and lost access to all his items including his social security card.    Patient seen and evaluated 12/9/24 with Attending Psychiatrist Dr. Giraldo. Patient expresses feeling better. Denies any suicidal/homicidal ideations. Verbalizes having time to think about his situation and come up with a plan to get his situation resolved. Patients’ depression and SI appear to stem form psychosocial stressors of the recent loss of his job and housing. Patient recently started on Zoloft.     #Admit    #Plan 12/9/24    #MDD  -Zoloft 50mg daily    -Hydroxyzine 50mg Q6 PRN for anxiety or insomnia  -Haldol 5mg Q8 PRN for agitation or psychosis  -Benadryl 50mg Q8 PRN for EPS  -Lorazepam 2mg Q8 PRN for aggression    #Agitation  -for agitation not amenable to verbal redirection, may give haldol 5 mg q6h prn, ativan 2 mg q6h prn, benadryl 50 mg q6h prn with escalation to IM if pt is a danger to self or/and others with repeat EKG to ensure QTc <500 ms.

## 2024-12-09 NOTE — BH INPATIENT PSYCHIATRY PROGRESS NOTE - PRN MEDS
MEDICATIONS  (PRN):  diphenhydrAMINE 50 milliGRAM(s) Oral every 6 hours PRN EPS  haloperidol     Tablet 5 milliGRAM(s) Oral every 8 hours PRN agitation  LORazepam     Tablet 2 milliGRAM(s) Oral every 8 hours PRN Anxiety   MEDICATIONS  (PRN):  diphenhydrAMINE 50 milliGRAM(s) Oral every 6 hours PRN EPS  haloperidol     Tablet 5 milliGRAM(s) Oral every 8 hours PRN agitation  hydrOXYzine hydrochloride 50 milliGRAM(s) Oral every 6 hours PRN Anxiety and or insomnia  LORazepam     Tablet 2 milliGRAM(s) Oral every 8 hours PRN Anxiety

## 2024-12-09 NOTE — BH INPATIENT PSYCHIATRY PROGRESS NOTE - NSBHCHARTREVIEWVS_PSY_A_CORE FT
Vital Signs Last 24 Hrs  T(C): 37 (12-09-24 @ 07:51), Max: 37.1 (12-08-24 @ 16:08)  T(F): 98.6 (12-09-24 @ 07:51), Max: 98.8 (12-08-24 @ 16:08)  HR: 70 (12-09-24 @ 07:51) (70 - 88)  BP: 120/78 (12-09-24 @ 07:51) (120/78 - 148/88)  BP(mean): --  RR: --  SpO2: --     Vital Signs Last 24 Hrs  T(C): 36.6 (12-09-24 @ 15:47), Max: 37 (12-09-24 @ 07:51)  T(F): 97.8 (12-09-24 @ 15:47), Max: 98.6 (12-09-24 @ 07:51)  HR: 70 (12-09-24 @ 15:47) (70 - 70)  BP: 135/76 (12-09-24 @ 15:47) (120/78 - 135/76)  BP(mean): --  RR: --  SpO2: --

## 2024-12-09 NOTE — BH TREATMENT PLAN - NSTXDCOPLKINTERSW_PSY_ALL_CORE
SW will educate the importance of continuing treatment and will send appropriate referrals to connect pt with next level of care.

## 2024-12-09 NOTE — BH INPATIENT PSYCHIATRY PROGRESS NOTE - CURRENT MEDICATION
MEDICATIONS  (STANDING):  influenza   Vaccine 0.5 milliLiter(s) IntraMuscular once  sertraline 50 milliGRAM(s) Oral daily    MEDICATIONS  (PRN):  diphenhydrAMINE 50 milliGRAM(s) Oral every 6 hours PRN EPS  haloperidol     Tablet 5 milliGRAM(s) Oral every 8 hours PRN agitation  LORazepam     Tablet 2 milliGRAM(s) Oral every 8 hours PRN Anxiety   MEDICATIONS  (STANDING):  influenza   Vaccine 0.5 milliLiter(s) IntraMuscular once  sertraline 50 milliGRAM(s) Oral daily    MEDICATIONS  (PRN):  diphenhydrAMINE 50 milliGRAM(s) Oral every 6 hours PRN EPS  haloperidol     Tablet 5 milliGRAM(s) Oral every 8 hours PRN agitation  hydrOXYzine hydrochloride 50 milliGRAM(s) Oral every 6 hours PRN Anxiety and or insomnia  LORazepam     Tablet 2 milliGRAM(s) Oral every 8 hours PRN Anxiety

## 2024-12-09 NOTE — BH TREATMENT PLAN - NSTXDEPRESINTERRN_PSY_ALL_CORE
Assess and monitor risk for suicide, including but not limited to thoughts and ideation as patient may not disclose voluntarily; consider need for safety measures.    Provide opportunity for patient and family/caregiver to express feelings, stressors and self-perception (e.g., verbalization, journaling).    Convey caring approach, empathy and potential for change; hope is key for recovery.    Utilize existing coping strategies and assist in developing new strategies, such as relaxation, gratitude, music and problem-solving; assess for ineffective strategies (e.g., substance use, isolation).    Recognize past and present achievements, successes and personal strengths.    Empower participation in planning care and activities, as well as development of attainable goals, to increase self-esteem and feelings of control.    Promote self-care; support balanced sleep, physical activity and nutrition.    Consider referral for a comprehensive assessment if there are concerns about the number, severity and duration of symptoms; degree of distress; functional impairment or excessive substance use.

## 2024-12-09 NOTE — PSYCHIATRIC REHAB INITIAL EVALUATION - NSBHPRTOOLBOX_PSY_ALL_CORE
Pt can identify 2 friends and his father to reach out for support but states that "they are busy with their own lives" and feels like he will be a "burden" on them./Entertainment/Music

## 2024-12-09 NOTE — BH TREATMENT PLAN - NSTXSUICIDINTERRN_PSY_ALL_CORE
Assess and monitor potential for self-harm including ideation, intention, plan and lethality.    Initiate suicide-risk precautions; provide a safe environment; remove all potentially dangerous items; monitor all activities carefully.    Provide additional safety measures based on level of risk (e.g., one-to-one observation).    Anticipate initiation of pharmacologic therapy; monitor for oral hoarding when administering medication.    Determine a mutual risk management and patient safety plan; set clear and specific parameters.

## 2024-12-10 PROCEDURE — 99231 SBSQ HOSP IP/OBS SF/LOW 25: CPT

## 2024-12-10 RX ADMIN — SERTRALINE HYDROCHLORIDE 50 MILLIGRAM(S): 100 TABLET, FILM COATED ORAL at 08:25

## 2024-12-10 NOTE — BH INPATIENT PSYCHIATRY PROGRESS NOTE - CURRENT MEDICATION
MEDICATIONS  (STANDING):  influenza   Vaccine 0.5 milliLiter(s) IntraMuscular once  sertraline 50 milliGRAM(s) Oral daily    MEDICATIONS  (PRN):  diphenhydrAMINE 50 milliGRAM(s) Oral every 6 hours PRN EPS  haloperidol     Tablet 5 milliGRAM(s) Oral every 8 hours PRN agitation  hydrOXYzine hydrochloride 50 milliGRAM(s) Oral every 6 hours PRN Anxiety and or insomnia  LORazepam     Tablet 2 milliGRAM(s) Oral every 8 hours PRN Anxiety

## 2024-12-10 NOTE — BH INPATIENT PSYCHIATRY PROGRESS NOTE - NSBHASSESSSUMMFT_PSY_ALL_CORE
Pt is a 35 y/o single man, no PMH/PPH, recently lost his job and housing after a [place] period [place] housing court in the context of falling behind in paying rent, has been at the [facility], presented to the ER with SI with intent and plan to jump off the [place] into the water and drown after finding that his apartment was already locked and lost access to all his items including his social security card.    Patient seen and evaluated 12/9/24 with Attending Psychiatrist Dr. Giraldo. Patient expresses feeling better. Denies any suicidal/homicidal ideations. Verbalizes having time to think about his situation and come up with a plan to get his situation resolved. Patients’ depression and SI appear to stem form psychosocial stressors of the recent loss of his job and housing. Patient recently started on Zoloft.     #Admit    #Plan 12/9/24    #MDD  -Zoloft 50mg daily    -Hydroxyzine 50mg Q6 PRN for anxiety or insomnia  -Haldol 5mg Q8 PRN for agitation or psychosis  -Benadryl 50mg Q8 PRN for EPS  -Lorazepam 2mg Q8 PRN for aggression    #Agitation  -for agitation not amenable to verbal redirection, may give haldol 5 mg q6h prn, ativan 2 mg q6h prn, benadryl 50 mg q6h prn with escalation to IM if pt is a danger to self or/and others with repeat EKG to ensure QTc <500 ms.     Pt is a 35 y/o single man, no PMH/PPH, recently lost his job and housing after a [place] period [place] housing court in the context of falling behind in paying rent, has been at the [facility] shelter, presented to the ER with SI with intent and plan to jump off the [place] [address] into the water and drown after finding that his apartment was already locked and lost access to all his items including his social security card.    Patient seen and evaluated 12/10/24. Denies any suicidal/homicidal ideations. Patients’ depression and SI appear to stem from psychosocial stressors of the recent loss of his job and housing. Patient verbalized coping skills to use when discharged. Patient less isolative to his room yesterday. Patient attended group yesterday and found it beneficial.    #Admit    #Plan 12/10/24    #MDD  -Zoloft 50mg daily    -Hydroxyzine 50mg Q6 PRN for anxiety or insomnia  -Haldol 5mg Q8 PRN for agitation or psychosis  -Benadryl 50mg Q8 PRN for EPS  -Lorazepam 2mg Q8 PRN for aggression    #Agitation  -for agitation not amenable to verbal redirection, may give haldol 5 mg q6h prn, ativan 2 mg q6h prn, benadryl 50 mg q6h prn with escalation to IM if pt is a danger to self or/and others with repeat EKG to ensure QTc <500 ms.

## 2024-12-10 NOTE — BH INPATIENT PSYCHIATRY PROGRESS NOTE - NSBHATTESTBILLING_PSY_A_CORE
49938-Fravsoymxp OBS or IP - moderate complexity OR 35-49 mins 81201-Umseefgcft OBS or IP - low complexity OR 25-34 mins

## 2024-12-10 NOTE — BH INPATIENT PSYCHIATRY PROGRESS NOTE - NSBHMETABOLIC_PSY_ALL_CORE_FT
BMI: BMI (kg/m2): 36.6 (12-07-24 @ 06:20)  HbA1c:   Glucose:   BP: 125/76 (12-10-24 @ 09:03) (120/78 - 148/88)Vital Signs Last 24 Hrs  T(C): 37.1 (12-10-24 @ 09:03), Max: 37.1 (12-10-24 @ 09:03)  T(F): 98.7 (12-10-24 @ 09:03), Max: 98.7 (12-10-24 @ 09:03)  HR: 72 (12-10-24 @ 09:03) (70 - 72)  BP: 125/76 (12-10-24 @ 09:03) (125/76 - 135/76)  BP(mean): --  RR: --  SpO2: --      Lipid Panel:

## 2024-12-10 NOTE — BH INPATIENT PSYCHIATRY PROGRESS NOTE - PRN MEDS
MEDICATIONS  (PRN):  diphenhydrAMINE 50 milliGRAM(s) Oral every 6 hours PRN EPS  haloperidol     Tablet 5 milliGRAM(s) Oral every 8 hours PRN agitation  hydrOXYzine hydrochloride 50 milliGRAM(s) Oral every 6 hours PRN Anxiety and or insomnia  LORazepam     Tablet 2 milliGRAM(s) Oral every 8 hours PRN Anxiety

## 2024-12-10 NOTE — BH INPATIENT PSYCHIATRY PROGRESS NOTE - NSBHCHARTREVIEWVS_PSY_A_CORE FT
Vital Signs Last 24 Hrs  T(C): 37.1 (12-10-24 @ 09:03), Max: 37.1 (12-10-24 @ 09:03)  T(F): 98.7 (12-10-24 @ 09:03), Max: 98.7 (12-10-24 @ 09:03)  HR: 72 (12-10-24 @ 09:03) (70 - 72)  BP: 125/76 (12-10-24 @ 09:03) (125/76 - 135/76)  BP(mean): --  RR: --  SpO2: --

## 2024-12-10 NOTE — BH INPATIENT PSYCHIATRY PROGRESS NOTE - NSBHFUPINTERVALHXFT_PSY_A_CORE
Patient seen and evaluated 12/9/24 with Attending Psychiatrist Dr. Giraldo. As per nursing report no acute events. On approach patient calm and cooperative. No agitation or aggression noted. Patient expresses feeling better. Denies any suicidal/homicidal ideations. Verbalizes having time to think about his situation and come up with a plan to get his situation resolved. Patients’ depression and SI appear to stem form psychosocial stressors of the recent loss of his job and housing. Patient recently started on Zoloft. Denies any side effects/adverse reactions. No side effects/adverse reactions noted. Patient isolative to his room. Encouraged to get out of his room, engage with peers and attend groups. Patient seen and evaluated 12/10/24. As per nursing report no acute events. On approach patient calm and cooperative. No agitation or aggression noted. Patient expresses feeling better. Denies any suicidal/homicidal ideations. Patients’ depression and SI appear to stem from psychosocial stressors of the recent loss of his job and housing. Patient recently started on Zoloft. Denies any side effects/adverse reactions. No side effects/adverse reactions noted. Patient verbalized coping skills to use when discharged. Patient less isolative to his room yesterday. Attended group yesterday and found it beneficial.

## 2024-12-11 PROCEDURE — 99231 SBSQ HOSP IP/OBS SF/LOW 25: CPT

## 2024-12-11 RX ADMIN — SERTRALINE HYDROCHLORIDE 50 MILLIGRAM(S): 100 TABLET, FILM COATED ORAL at 08:58

## 2024-12-11 NOTE — BH INPATIENT PSYCHIATRY PROGRESS NOTE - NSBHCHARTREVIEWVS_PSY_A_CORE FT
Vital Signs Last 24 Hrs  T(C): 36.7 (12-11-24 @ 08:26), Max: 36.7 (12-11-24 @ 08:26)  T(F): 98 (12-11-24 @ 08:26), Max: 98 (12-11-24 @ 08:26)  HR: 73 (12-11-24 @ 08:26) (73 - 73)  BP: 147/82 (12-11-24 @ 08:26) (147/82 - 147/82)  BP(mean): --  RR: --  SpO2: --

## 2024-12-11 NOTE — BH INPATIENT PSYCHIATRY PROGRESS NOTE - NSBHMETABOLIC_PSY_ALL_CORE_FT
BMI: BMI (kg/m2): 36.6 (12-07-24 @ 06:20)  HbA1c:   Glucose:   BP: 147/82 (12-11-24 @ 08:26) (120/78 - 148/88)Vital Signs Last 24 Hrs  T(C): 36.7 (12-11-24 @ 08:26), Max: 36.7 (12-11-24 @ 08:26)  T(F): 98 (12-11-24 @ 08:26), Max: 98 (12-11-24 @ 08:26)  HR: 73 (12-11-24 @ 08:26) (73 - 73)  BP: 147/82 (12-11-24 @ 08:26) (147/82 - 147/82)  BP(mean): --  RR: --  SpO2: --      Lipid Panel:

## 2024-12-11 NOTE — BH INPATIENT PSYCHIATRY PROGRESS NOTE - NSBHFUPINTERVALHXFT_PSY_A_CORE
Patient seen and evaluated 12/11/24. As per nursing report no acute events. On approach patient calm and cooperative. No agitation or aggression noted. Verbalizes an improved mood since admission. Denies any suicidal/homicidal ideations. Patient recently started on Zoloft. Denies any side effects/adverse reactions. No side effects/adverse reactions noted. Patient verbalized thinking about coping skills to use when discharged. Patient less isolative to the room. Visible on the unit engaging with peers and attending groups. Anticipated discharge at end of week provided patient continues to improve.

## 2024-12-11 NOTE — BH INPATIENT PSYCHIATRY PROGRESS NOTE - NSBHASSESSSUMMFT_PSY_ALL_CORE
Pt is a 33 y/o single man, no PMH/PPH, recently lost his job and housing after [place] period in the housing court in the context of falling behind in paying rent, has been at the [facility], presented to the ER with SI with intent and plan to jump off the [place] into the water and drown after finding that his apartment was already locked and lost access to all his items including his social security card.    Patient seen and evaluated 12/11/24. Verbalizes an improved mood since admission. Denies any suicidal/homicidal ideations. Patient recently started on Zoloft. Patient verbalized thinking about coping skills to use when discharged. Visible on the unit engaging with peers and attending groups. Anticipated discharge at end of week provided patient continues to improve.    #Admit    #Plan 12/11/24    #MDD  -Zoloft 50mg daily    -Hydroxyzine 50mg Q6 PRN for anxiety or insomnia  -Haldol 5mg Q8 PRN for agitation or psychosis  -Benadryl 50mg Q8 PRN for EPS  -Lorazepam 2mg Q8 PRN for aggression    #Agitation  -for agitation not amenable to verbal redirection, may give haldol 5 mg q6h prn, ativan 2 mg q6h prn, benadryl 50 mg q6h prn with escalation to IM if pt is a danger to self or/and others with repeat EKG to ensure QTc <500 ms.

## 2024-12-12 PROCEDURE — 99231 SBSQ HOSP IP/OBS SF/LOW 25: CPT

## 2024-12-12 RX ORDER — SERTRALINE HYDROCHLORIDE 100 MG/1
1 TABLET, FILM COATED ORAL
Qty: 14 | Refills: 0
Start: 2024-12-12 | End: 2024-12-25

## 2024-12-12 RX ADMIN — SERTRALINE HYDROCHLORIDE 50 MILLIGRAM(S): 100 TABLET, FILM COATED ORAL at 08:24

## 2024-12-12 NOTE — BH INPATIENT PSYCHIATRY DISCHARGE NOTE - NSDCMRMEDTOKEN_GEN_ALL_CORE_FT
sertraline 50 mg oral tablet: 1 tab(s) orally once a day x 14 days Continue until told otherwise by your provider.

## 2024-12-12 NOTE — BH INPATIENT PSYCHIATRY PROGRESS NOTE - NSBHFUPINTERVALHXFT_PSY_A_CORE
Patient seen and evaluated 12/12/24. As per nursing report no acute events. On approach patient calm and cooperative. No agitation or aggression noted. Verbalizes an improved mood since admission. Denies any suicidal/homicidal ideations. Able to contract for safety. Patient visible on the unit engaging with peers and attending groups. Anticipated discharge tomorrow 12/13/24. Compliant with medication. Does not warrant continued Inpatient hospitalization. Patient does not present a risk to self or others at this time.

## 2024-12-12 NOTE — BH INPATIENT PSYCHIATRY DISCHARGE NOTE - NSDCCPCAREPLAN_GEN_ALL_CORE_FT
PRINCIPAL DISCHARGE DIAGNOSIS  Diagnosis: Current severe episode of major depressive disorder without psychotic features without prior episode  Assessment and Plan of Treatment: Patient to continue with prescribed medication and follow up with outpatient

## 2024-12-12 NOTE — BH INPATIENT PSYCHIATRY DISCHARGE NOTE - NSBHASSESSSUMMFT_PSY_ALL_CORE
Pt is a 33 y/o single man, no PMH/PPH, recently lost his job and housing after a 4 month period in the housing court in the context of falling behind in paying rent, has been at the [facility], presented to the ER with SI with intent and plan to jump off the [place] into the water and drown after finding that his apartment was already locked and lost access to all his items including his social security card.    Patient seen and evaluated 12/12/24. Verbalizes an improved mood since admission. Denies any suicidal/homicidal ideations. Able to contract for safety. Patient visible on the unit engaging with peers and attending groups. Anticipated discharge tomorrow 12/13/24. Does not warrant continued Inpatient hospitalization. Patient does not present a risk to self or others at this time.    #Admit    #Plan 12/12/24    #MDD  -Zoloft 50mg daily

## 2024-12-12 NOTE — BH DISCHARGE NOTE NURSING/SOCIAL WORK/PSYCH REHAB - NSCDUDCCRISIS_PSY_A_CORE
[facility]  [phone] [facility] ([phone])  Text "WELL" to [phone]  Website: [email or web address]  [facility]  1 [phone]) LIF[facility] [phone] Suicide and Crisis Lifeline

## 2024-12-12 NOTE — BH INPATIENT PSYCHIATRY PROGRESS NOTE - NSBHMETABOLIC_PSY_ALL_CORE_FT
BMI: BMI (kg/m2): 36.6 (12-07-24 @ 06:20)  HbA1c:   Glucose:   BP: 133/98 (12-11-24 @ 15:57) (125/76 - 147/82)Vital Signs Last 24 Hrs  T(C): 36.6 (12-11-24 @ 15:57), Max: 36.6 (12-11-24 @ 15:57)  T(F): 97.9 (12-11-24 @ 15:57), Max: 97.9 (12-11-24 @ 15:57)  HR: 70 (12-11-24 @ 15:57) (70 - 70)  BP: 133/98 (12-11-24 @ 15:57) (133/98 - 133/98)  BP(mean): --  RR: --  SpO2: --      Lipid Panel:

## 2024-12-12 NOTE — BH INPATIENT PSYCHIATRY DISCHARGE NOTE - HOSPITAL COURSE
Pt is a 35 y/o single man, no PMH/PPH, recently lost his job and housing after a 4 month period in the housing court in the context of falling behind in paying rent, has been at the [address] [place] [address], presented to the ER with SI with intent and plan to jump off the [facility] into the water and drown after finding that his apartment was already locked and lost access to all his items including his social security card.    From IPP:    Nursing reports no acute events overnight. Per chart review the patient has not required any behavioral PRNS since the last assessment.    Chart reviewed, patient seen and evaluated in AM. Patient is calm, cooperative, pleasant, but appears dysthymic and avoids eye contact. Patient reports that he currently feels "overwhelmed." He states that he has felt depressed, hopeless, helpless, and worthless for the past three days in the context of difficulties obtaining a job and being locked out of his apartment with loss of access to all of his items. He notes that he has great financial troubles which have contributed to him going into arrears for the past 4 months. He has been trying to find a job in order to pay rent, however his training days have conflicted with housing court, which in turn have lead him to losing the job. The loss of job lead to him being unable to pay rent, and now he has been locked out of his house. He states that he has minimal social supports- he names two friends, both which are busy with their own lives, and a father who lives in [place] but he does not want to ask him for help because he does not want to burden him. The patient notes a lot of shame and guilt feelings around being a burden, as well as not living up to his idealized conceptions of masculinity. He states that prior to presenting to the ED he felt so depressed that he seriously contemplated jumping into the river from the [facility], thinking that he would drown because he does not know how to swim. He states that he decided to go to the ED instead because he still had dreams of making it big financially and having a child.  Patient endorsed OK sleep and appetite.     Patient denies any current or history of symptoms consistent with a diagnosis of jimena, PTSD, or anxiety. Patient denies any current SI on interview.    From ED:    Pt reports that he has had SI since going to his apartment yesterday and finding it was locked. He said he still feels suicidal but he did not attempt as still has some will to live but presents ambivalent. Has never been in this situation before. Describes his attempt to become a  in the past year but never qualifying due to not meeting their physical/health metrics, after leaving his job at [employer] a year ago. Said he was relying on savings. Then he tried to get a job at [employer] during housing court but his orientation day overlapped with a court date. He said he did not know he could move his court date with a valid reason and missed his orientation to go to court, then lost this job which led to losing in court eventually. He describes significant shame and guilt over losing this apartment, feeling like he disappointed his father as his father passed down this apt to him after moving to [place] (has not informed father for this reason). Reports hopelessness, loneliness, says that he is all by himself in life. Reports feeling down for weeks, anhedonia, sleep is "trash" denies appetite/weight changes. Denies past suicidality or hospitalizations.     Pt presents with elevated suicide risk, agreeable to voluntary hospitalization and ssri trial at the hospital. Risks/benefits/alternatives d/w pt, pt agrees to sertraline. Requires inpatient level of care due to safety risk.    Patient seen and evaluated 12/12/24. As per nursing report no acute events. On approach patient calm and cooperative. No agitation or aggression noted. Verbalizes an improved mood since admission. Denies any suicidal/homicidal ideations. Able to contract for safety. Patient visible on the unit engaging with peers and attending groups. Anticipated discharge tomorrow 12/13/24. Compliant with medication. Does not warrant continued Inpatient hospitalization. Patient does not present a risk to self or others at this time.

## 2024-12-12 NOTE — BH INPATIENT PSYCHIATRY PROGRESS NOTE - NSBHCHARTREVIEWVS_PSY_A_CORE FT
Vital Signs Last 24 Hrs  T(C): 36.6 (12-11-24 @ 15:57), Max: 36.6 (12-11-24 @ 15:57)  T(F): 97.9 (12-11-24 @ 15:57), Max: 97.9 (12-11-24 @ 15:57)  HR: 70 (12-11-24 @ 15:57) (70 - 70)  BP: 133/98 (12-11-24 @ 15:57) (133/98 - 133/98)  BP(mean): --  RR: --  SpO2: --

## 2024-12-12 NOTE — BH INPATIENT PSYCHIATRY DISCHARGE NOTE - DESCRIPTION
Domiciled alone. Recently lost his job and housing after a 4 month period in the housing court in the context of falling behind in paying rent. Single, has very few friends, has father who lives in [place]. Mother  from sarcoidosis when he was 6 years old.

## 2024-12-12 NOTE — BH DISCHARGE NOTE NURSING/SOCIAL WORK/PSYCH REHAB - PATIENT PORTAL LINK FT
You can access the FollowMyHealth Patient Portal offered by [facility] by registering at the following website: [email or web address]. By joining [facility]’s FollowMyHealth portal, you will also be able to view your health information using other applications (apps) compatible with our system.

## 2024-12-12 NOTE — BH INPATIENT PSYCHIATRY PROGRESS NOTE - NSBHASSESSSUMMFT_PSY_ALL_CORE
Pt is a 35 y/o single man, no PMH/PPH, recently lost his job and housing after a 4 month period in the housing court in the context of falling behind in paying rent, has been at the [facility], presented to the ER with SI with intent and plan to jump off the [place] into the water and drown after finding that his apartment was already locked and lost access to all his items including his social security card.    Patient seen and evaluated 12/12/24. Verbalizes an improved mood since admission. Denies any suicidal/homicidal ideations. Able to contract for safety. Patient visible on the unit engaging with peers and attending groups. Anticipated discharge tomorrow 12/13/24. Does not warrant continued Inpatient hospitalization. Patient does not present a risk to self or others at this time.    #Admit    #Plan 12/12/24    #MDD  -Zoloft 50mg daily    -Hydroxyzine 50mg Q6 PRN for anxiety or insomnia  -Haldol 5mg Q8 PRN for agitation or psychosis  -Benadryl 50mg Q8 PRN for EPS  -Lorazepam 2mg Q8 PRN for aggression    #Agitation  -for agitation not amenable to verbal redirection, may give haldol 5 mg q6h prn, ativan 2 mg q6h prn, benadryl 50 mg q6h prn with escalation to IM if pt is a danger to self or/and others with repeat EKG to ensure QTc <500 ms.

## 2024-12-13 VITALS — SYSTOLIC BLOOD PRESSURE: 119 MMHG | DIASTOLIC BLOOD PRESSURE: 68 MMHG | HEART RATE: 81 BPM | TEMPERATURE: 98 F

## 2024-12-13 PROCEDURE — 99238 HOSP IP/OBS DSCHRG MGMT 30/<: CPT

## 2024-12-13 RX ADMIN — SERTRALINE HYDROCHLORIDE 50 MILLIGRAM(S): 100 TABLET, FILM COATED ORAL at 08:21

## 2024-12-13 NOTE — BH INPATIENT PSYCHIATRY PROGRESS NOTE - NSBHCHARTREVIEWVS_PSY_A_CORE FT
Vital Signs Last 24 Hrs  T(C): 36.7 (12-13-24 @ 08:00), Max: 36.9 (12-12-24 @ 16:27)  T(F): 98 (12-13-24 @ 08:00), Max: 98.4 (12-12-24 @ 16:27)  HR: 81 (12-13-24 @ 08:00) (81 - 89)  BP: 119/68 (12-13-24 @ 08:00) (119/68 - 125/75)  BP(mean): --  RR: --  SpO2: --

## 2024-12-13 NOTE — BH INPATIENT PSYCHIATRY PROGRESS NOTE - NSDCCRITERIA_PSY_ALL_CORE
When patient no longer is at an acutely elevated risk of harm to self

## 2024-12-13 NOTE — BH INPATIENT PSYCHIATRY PROGRESS NOTE - NSTXSUICIDGOAL_PSY_ALL_CORE
Will identify 1 trigger / stressor that exacerbates suicidal

## 2024-12-13 NOTE — BH INPATIENT PSYCHIATRY PROGRESS NOTE - NSBHMSETHTCONTENT_PSY_A_CORE
Unremarkable
Ruminations/Hopelessness/Guilt/Suicidality/Other
Unremarkable
Ruminations/Hopelessness/Guilt/Suicidality/Other
Unremarkable

## 2024-12-13 NOTE — BH INPATIENT PSYCHIATRY PROGRESS NOTE - NSTXDEPRESGOAL_PSY_ALL_CORE
Will identify 2 coping skills that assist in improving mood
Exhibit improvements in self-grooming, hygiene, sleep and appetite
Exhibit improvements in self-grooming, hygiene, sleep and appetite
Will identify 2 coping skills that assist in improving mood

## 2024-12-13 NOTE — BH INPATIENT PSYCHIATRY PROGRESS NOTE - NSBHMETABOLIC_PSY_ALL_CORE_FT
BMI: BMI (kg/m2): 36.6 (12-07-24 @ 06:20)  HbA1c:   Glucose:   BP: 119/68 (12-13-24 @ 08:00) (119/68 - 147/82)Vital Signs Last 24 Hrs  T(C): 36.7 (12-13-24 @ 08:00), Max: 36.9 (12-12-24 @ 16:27)  T(F): 98 (12-13-24 @ 08:00), Max: 98.4 (12-12-24 @ 16:27)  HR: 81 (12-13-24 @ 08:00) (81 - 89)  BP: 119/68 (12-13-24 @ 08:00) (119/68 - 125/75)  BP(mean): --  RR: --  SpO2: --      Lipid Panel:

## 2024-12-13 NOTE — BH INPATIENT PSYCHIATRY PROGRESS NOTE - NSBHASSESSSUMMFT_PSY_ALL_CORE
Pt is a 33 y/o single man, no PMH/PPH, recently lost his job and housing after a 4 month period in the housing court in the context of falling behind in paying rent, has been at the [facility], presented to the ER with SI with intent and plan to jump off the [place] into the water and drown after finding that his apartment was already locked and lost access to all his items including his social security card.    D/C today 12/13/24. Transportation provided. Meds sent to [facility] pharmacy as requested by patient. Patient appeared to be in good spirits today. Endorses a better mood. Insight and judgment have improved. Denies suicidal/ homicidal ideations. Patient able to contract for safety. Denies any A/V hallucinations. Compliant with medication. Does not warrant continued Inpatient hospitalization. Writer reviewed D/C papers with patient. Patient verbalized understanding. Patient does not appear to be of risk to self or others at this time.    #Admit    #Plan 12/13/24    #MDD  -Zoloft 50mg daily    -Hydroxyzine 50mg Q6 PRN for anxiety or insomnia  -Haldol 5mg Q8 PRN for agitation or psychosis  -Benadryl 50mg Q8 PRN for EPS  -Lorazepam 2mg Q8 PRN for aggression    #Agitation  -for agitation not amenable to verbal redirection, may give haldol 5 mg q6h prn, ativan 2 mg q6h prn, benadryl 50 mg q6h prn with escalation to IM if pt is a danger to self or/and others with repeat EKG to ensure QTc <500 ms.

## 2024-12-13 NOTE — BH INPATIENT PSYCHIATRY PROGRESS NOTE - CURRENT MEDICATION
MEDICATIONS  (STANDING):  sertraline 50 milliGRAM(s) Oral daily    MEDICATIONS  (PRN):  diphenhydrAMINE 50 milliGRAM(s) Oral every 6 hours PRN EPS  haloperidol     Tablet 5 milliGRAM(s) Oral every 8 hours PRN agitation  hydrOXYzine hydrochloride 50 milliGRAM(s) Oral every 6 hours PRN Anxiety and or insomnia  LORazepam     Tablet 2 milliGRAM(s) Oral every 8 hours PRN Anxiety

## 2024-12-13 NOTE — BH INPATIENT PSYCHIATRY PROGRESS NOTE - NSBHATTESTAPPBILLTIME_PSY_A_CORE
I attest my time as YOLETTE is greater than 50% of the total combined time spent on qualifying patient care activities. I have reviewed and verified the documentation.

## 2024-12-13 NOTE — BH INPATIENT PSYCHIATRY PROGRESS NOTE - NSBHFUPINTERVALHXFT_PSY_A_CORE
D/C today 12/13/24. Transportation provided. Meds sent to [facility] pharmacy as requested by patient. Patient appeared to be in good spirits today. Endorses a better mood. Insight and judgment have improved. Denies suicidal/ homicidal ideations. Patient able to contract for safety. Denies any A/V hallucinations. Compliant with medication. Does not warrant continued Inpatient hospitalization. Writer reviewed D/C papers with patient. Patient verbalized understanding. Patient does not appear to be of risk to self or others at this time.

## 2024-12-15 ENCOUNTER — INPATIENT (INPATIENT)
Facility: HOSPITAL | Age: 34
LOS: 10 days | Discharge: HOME | DRG: 751 | End: 2024-12-26
Attending: PSYCHIATRY & NEUROLOGY | Admitting: PSYCHIATRY & NEUROLOGY
Payer: MEDICAID

## 2024-12-15 VITALS
SYSTOLIC BLOOD PRESSURE: 133 MMHG | TEMPERATURE: 98 F | HEART RATE: 69 BPM | WEIGHT: 274.03 LBS | OXYGEN SATURATION: 98 % | DIASTOLIC BLOOD PRESSURE: 89 MMHG | RESPIRATION RATE: 18 BRPM | HEIGHT: 73 IN

## 2024-12-15 LAB
ALBUMIN SERPL ELPH-MCNC: 4.3 G/DL — SIGNIFICANT CHANGE UP (ref 3.5–5.2)
ALP SERPL-CCNC: 51 U/L — SIGNIFICANT CHANGE UP (ref 30–115)
ALT FLD-CCNC: 23 U/L — SIGNIFICANT CHANGE UP (ref 0–41)
ANION GAP SERPL CALC-SCNC: 14 MMOL/L — SIGNIFICANT CHANGE UP (ref 7–14)
APAP SERPL-MCNC: <5 UG/ML — LOW (ref 10–30)
AST SERPL-CCNC: 22 U/L — SIGNIFICANT CHANGE UP (ref 0–41)
BASOPHILS # BLD AUTO: 0.04 K/UL — SIGNIFICANT CHANGE UP (ref 0–0.2)
BASOPHILS NFR BLD AUTO: 0.5 % — SIGNIFICANT CHANGE UP (ref 0–1)
BILIRUB SERPL-MCNC: 0.3 MG/DL — SIGNIFICANT CHANGE UP (ref 0.2–1.2)
BUN SERPL-MCNC: 13 MG/DL — SIGNIFICANT CHANGE UP (ref 10–20)
CALCIUM SERPL-MCNC: 9.1 MG/DL — SIGNIFICANT CHANGE UP (ref 8.4–10.5)
CHLORIDE SERPL-SCNC: 105 MMOL/L — SIGNIFICANT CHANGE UP (ref 98–110)
CO2 SERPL-SCNC: 23 MMOL/L — SIGNIFICANT CHANGE UP (ref 17–32)
CREAT SERPL-MCNC: 1.2 MG/DL — SIGNIFICANT CHANGE UP (ref 0.7–1.5)
EGFR: 81 ML/MIN/1.73M2 — SIGNIFICANT CHANGE UP
EOSINOPHIL # BLD AUTO: 0.34 K/UL — SIGNIFICANT CHANGE UP (ref 0–0.7)
EOSINOPHIL NFR BLD AUTO: 4.1 % — SIGNIFICANT CHANGE UP (ref 0–8)
ETHANOL SERPL-MCNC: 198 MG/DL — HIGH
GLUCOSE SERPL-MCNC: 110 MG/DL — HIGH (ref 70–99)
HCT VFR BLD CALC: 42.8 % — SIGNIFICANT CHANGE UP (ref 42–52)
HGB BLD-MCNC: 14.4 G/DL — SIGNIFICANT CHANGE UP (ref 14–18)
IMM GRANULOCYTES NFR BLD AUTO: 0.2 % — SIGNIFICANT CHANGE UP (ref 0.1–0.3)
LYMPHOCYTES # BLD AUTO: 3.19 K/UL — SIGNIFICANT CHANGE UP (ref 1.2–3.4)
LYMPHOCYTES # BLD AUTO: 38.1 % — SIGNIFICANT CHANGE UP (ref 20.5–51.1)
MCHC RBC-ENTMCNC: 29.8 PG — SIGNIFICANT CHANGE UP (ref 27–31)
MCHC RBC-ENTMCNC: 33.6 G/DL — SIGNIFICANT CHANGE UP (ref 32–37)
MCV RBC AUTO: 88.4 FL — SIGNIFICANT CHANGE UP (ref 80–94)
MONOCYTES # BLD AUTO: 0.47 K/UL — SIGNIFICANT CHANGE UP (ref 0.1–0.6)
MONOCYTES NFR BLD AUTO: 5.6 % — SIGNIFICANT CHANGE UP (ref 1.7–9.3)
NEUTROPHILS # BLD AUTO: 4.31 K/UL — SIGNIFICANT CHANGE UP (ref 1.4–6.5)
NEUTROPHILS NFR BLD AUTO: 51.5 % — SIGNIFICANT CHANGE UP (ref 42.2–75.2)
NRBC # BLD: 0 /100 WBCS — SIGNIFICANT CHANGE UP (ref 0–0)
PLATELET # BLD AUTO: 272 K/UL — SIGNIFICANT CHANGE UP (ref 130–400)
PMV BLD: 10.6 FL — HIGH (ref 7.4–10.4)
POTASSIUM SERPL-MCNC: 3.5 MMOL/L — SIGNIFICANT CHANGE UP (ref 3.5–5)
POTASSIUM SERPL-SCNC: 3.5 MMOL/L — SIGNIFICANT CHANGE UP (ref 3.5–5)
PROT SERPL-MCNC: 6.6 G/DL — SIGNIFICANT CHANGE UP (ref 6–8)
RBC # BLD: 4.84 M/UL — SIGNIFICANT CHANGE UP (ref 4.7–6.1)
RBC # FLD: 12.6 % — SIGNIFICANT CHANGE UP (ref 11.5–14.5)
SALICYLATES SERPL-MCNC: <0.3 MG/DL — LOW (ref 4–30)
SARS-COV-2 RNA SPEC QL NAA+PROBE: SIGNIFICANT CHANGE UP
SODIUM SERPL-SCNC: 142 MMOL/L — SIGNIFICANT CHANGE UP (ref 135–146)
WBC # BLD: 8.37 K/UL — SIGNIFICANT CHANGE UP (ref 4.8–10.8)
WBC # FLD AUTO: 8.37 K/UL — SIGNIFICANT CHANGE UP (ref 4.8–10.8)

## 2024-12-15 PROCEDURE — 99285 EMERGENCY DEPT VISIT HI MDM: CPT

## 2024-12-15 NOTE — ED PROVIDER NOTE - OBJECTIVE STATEMENT
34-year-old male with PMH of depression and suicidal ideation, recently discharged from [facility] yesterday, denies other PMH, presents ED for evaluation of suicidal ideation.  Patient states he recently was evicted from his apartment, lost his job and his girlfriend cheated on him causing [place] to feel [place]  Was admitted for this and discharged home yesterday.  States he contacted his father to ask if he could stay with him/help him but the father refused causing him to feel more depressed and suicidal again.  States [place]  Denies any homicidal ideation, auditory visual hallucinations.  Denies any medical complaints at this time

## 2024-12-15 NOTE — ED PROVIDER NOTE - PROGRESS NOTE DETAILS
ANTWAN: etoh level 198, will call telepsych @0028 which is 4 hours after blood was drawn EP: Patient endorsed to Dr. Yuan to follow-up psychiatry consult and dispo. ANTWAN: Patient seen by psychiatry, plan for reassessment in the morning.  Placed in psych obs

## 2024-12-15 NOTE — ED PROVIDER NOTE - INPATIENT RESIDENT/ACP NOTIFIED DATE
24H events:    Patient is a 76y old Female who presents with a chief complaint of Fever and chills (17 Jul 2019 15:17)    Primary diagnosis of Cholangitis     Today is hospital day 5d.     PAST MEDICAL & SURGICAL HISTORY  Asthma  Stress incontinence  Hypertension  Hyperlipemia  Rheumatoid arthritis  S/P total knee replacement, right    SOCIAL HISTORY:  Negative for smoking/alcohol/drug use.     ALLERGIES:  penicillins (Rash)  sulfa drugs (Rash)    MEDICATIONS:  STANDING MEDICATIONS  amLODIPine   Tablet 5 milliGRAM(s) Oral daily  ATENolol  Tablet 50 milliGRAM(s) Oral daily  cefTRIAXone   IVPB 2000 milliGRAM(s) IV Intermittent every 24 hours  chlorhexidine 4% Liquid 1 Application(s) Topical <User Schedule>  enoxaparin Injectable 40 milliGRAM(s) SubCutaneous every 24 hours  folic acid 1 milliGRAM(s) Oral daily  metroNIDAZOLE  IVPB 500 milliGRAM(s) IV Intermittent every 8 hours  oxybutynin 5 milliGRAM(s) Oral every 12 hours  pantoprazole    Tablet 40 milliGRAM(s) Oral before breakfast    PRN MEDICATIONS    VITALS:   T(F): 97.7  HR: 69  BP: 127/60  RR: 18  SpO2: 98%    LABS:                        9.6    3.80  )-----------( 140      ( 16 Jul 2019 06:55 )             30.5     07-16    143  |  106  |  15  ----------------------------<  89  3.9   |  22  |  0.9    Ca    8.2<L>      16 Jul 2019 06:55    TPro  6.0  /  Alb  2.6<L>  /  TBili  1.1  /  DBili  0.6<H>  /  AST  40  /  ALT  83<H>  /  AlkPhos  350<H>  07-16    PT/INR - ( 15 Jul 2019 19:00 )   PT: 12.80 sec;   INR: 1.11 ratio         PTT - ( 15 Jul 2019 19:00 )  PTT:33.2 sec          Culture - Blood (collected 15 Jul 2019 19:00)  Source: .Blood None  Preliminary Report (17 Jul 2019 03:01):    No growth to date.          RADIOLOGY:    PHYSICAL EXAM:  GENERAL: NAD, well-groomed, well-developed  HEAD:  Atraumatic, Normocephalic  EYES: EOMI, PERRLA, conjunctiva and sclera clear  ENMT: No tonsillar erythema, exudates, or enlargement; Moist mucous membranes, Good dentition, No lesions  NECK: Supple, No JVD, Normal thyroid  NERVOUS SYSTEM:  Alert & Oriented X3, Good concentration; Motor Strength 5/5 B/L upper and lower extremities; DTRs 2+ intact and symmetric  CHEST/LUNG: Clear to percussion bilaterally; No rales, rhonchi, wheezing, or rubs  HEART: Regular rate and rhythm; No murmurs, rubs, or gallops  ABDOMEN: Soft, Nontender, Nondistended; Bowel sounds present  EXTREMITIES:  2+ Peripheral Pulses, No clubbing, cyanosis, or edema  LYMPH: No lymphadenopathy noted  SKIN: No rashes or lesions 16-Dec-2024 04:01

## 2024-12-15 NOTE — ED PROVIDER NOTE - PHYSICAL EXAMINATION
VITAL SIGNS: I have reviewed nursing notes and confirm.  CONSTITUTIONAL: Well-developed; well-nourished; in no acute distress.  SKIN: Skin exam is warm and dry, no acute rash.  HEAD: Normocephalic; atraumatic.  EYES: PERRL,  conjunctiva and sclera clear.  ENT: mmm  CARD: S1, S2 normal; no murmurs, gallops, or rubs. Regular rate and rhythm.  RESP: Normal respiratory effort, no tachypnea or distress. Lungs CTAB, no wheezes, rales or rhonchi.  ABD: soft, NT/ND.  EXT: Normal ROM. No clubbing, cyanosis or edema.  NEURO: Alert, oriented. Grossly unremarkable. No focal deficits.  PSYCH: Cooperative, appropriate but poor eye contact.

## 2024-12-15 NOTE — ED PROVIDER NOTE - ATTENDING CONTRIBUTION TO CARE
34-year-old male with suicidal ideations, currently patient does not have a place to stay, discharge from [facility] on 12/13/2024 after he was admitted for similar complaints.  Calm and cooperative, agree with exam.  Plan: One-to-one observation, labs, EKG, psychiatry evaluation.  Prior records were reviewed.

## 2024-12-15 NOTE — ED ADULT TRIAGE NOTE - CHIEF COMPLAINT QUOTE
pt states : "I am the most depressed I have ever been, I cant find a woman to love me, I lost my apartment, im a black man, im a piece of sh*t. My Gf slept with another leona behind my back, I just wanted to be a dad"  Endorses suicidal thoughts, denies having a plan. 1:1 initiated in triage, belongings removed from pt  Denies homicidal thoughts

## 2024-12-15 NOTE — BH SOCIAL WORK CONFIRMATION FOLLOW UP NOTE - NSCOMMENTS_PSY_ALL_CORE
Attempted to reach patient x2, [phone].  No answer, unable to leave a voicemail.  Dylan was readmitted to [facility] IPP (12/16) prior to his outpatient appointment below:    [facility]  18-Dec-2024 13:00  [facility]  [phone]  Mental Health Treatment  In person intake with Sandy Gunter contact call: Attempted to reach patient x2, [phone].  No answer, unable to leave a voicemail.

## 2024-12-16 DIAGNOSIS — R45.851 SUICIDAL IDEATIONS: ICD-10-CM

## 2024-12-16 PROCEDURE — 83036 HEMOGLOBIN GLYCOSYLATED A1C: CPT

## 2024-12-16 PROCEDURE — 84443 ASSAY THYROID STIM HORMONE: CPT

## 2024-12-16 PROCEDURE — 80061 LIPID PANEL: CPT

## 2024-12-16 PROCEDURE — 99223 1ST HOSP IP/OBS HIGH 75: CPT

## 2024-12-16 PROCEDURE — 90792 PSYCH DIAG EVAL W/MED SRVCS: CPT | Mod: 95

## 2024-12-16 PROCEDURE — 36415 COLL VENOUS BLD VENIPUNCTURE: CPT

## 2024-12-16 RX ORDER — CLONAZEPAM 2 MG
0.5 TABLET ORAL
Refills: 0 | Status: DISCONTINUED | OUTPATIENT
Start: 2024-12-16 | End: 2024-12-17

## 2024-12-16 RX ORDER — SERTRALINE HYDROCHLORIDE 25 MG/1
50 TABLET ORAL DAILY
Refills: 0 | Status: DISCONTINUED | OUTPATIENT
Start: 2024-12-16 | End: 2024-12-17

## 2024-12-16 RX ORDER — TRAZODONE HYDROCHLORIDE 150 MG/1
50 TABLET ORAL AT BEDTIME
Refills: 0 | Status: DISCONTINUED | OUTPATIENT
Start: 2024-12-16 | End: 2024-12-17

## 2024-12-16 NOTE — BH PATIENT PROFILE - HOME MEDICATIONS
sertraline 50 mg oral tablet , 1 tab(s) orally once a day x 14 days Continue until told otherwise by your provider.

## 2024-12-16 NOTE — ED CDU PROVIDER INITIAL DAY NOTE - OBJECTIVE STATEMENT
34-year-old male with PMH of depression and suicidal ideation, recently discharged from [facility] yesterday, denies other PMH, presents ED for evaluation of suicidal ideation.  Patient states he recently was evicted from his apartment, lost his job and his girlfriend cheated on him causing [place] to feel depressed [place]  Was admitted for this and discharged home yesterday.  States he contacted his father to ask if he could stay with him/help him but the father refused causing him to feel more depressed and suicidal again.  States [place].  Denies any homicidal ideation, auditory visual hallucinations.  Denies any medical complaints at this time

## 2024-12-16 NOTE — ED BEHAVIORAL HEALTH ASSESSMENT NOTE - AXIS III
[Initial Consultation] : an initial consultation for [Blood Count Assessment] : blood count assessment [Coagulopathy] : coagulopathy [Family Member] : family member [Other: _____] : [unfilled] [Follow-Up Visit] : a follow-up visit for [FreeTextEntry2] : pulmonary embolism ; now on anticoagulation hx alcohol use

## 2024-12-16 NOTE — ED BEHAVIORAL HEALTH ASSESSMENT NOTE - LEGAL HISTORY
per micha, hx legal charges for assault, possession of weapon, and criminal mischief (birth year 1990, unable to confirm if case refers to pt)

## 2024-12-16 NOTE — ED BEHAVIORAL HEALTH NOTE - BEHAVIORAL HEALTH NOTE
Patient continues to request psychiatric admission. Reports he initially felt positive and more confident when he was discharged recently, but once he encountered his situation again of not knowing where to go or what to do, he felt helpless and reports suicidal thoughts started coming back. Reports these thoughts seemed like "the easy way out," but that another part of him continues to feel he can get through this after the crisis passes. States he has never been in this kind of situation before in his life which is why it is so distressing for him.    Plan for voluntary psych admission. Continue sertraline 50mg daily. Patient expressed no known problems or needs

## 2024-12-16 NOTE — BH PATIENT PROFILE - NSPROIMPLANTSMEDDEV_GEN_A_NUR
Date of Service: 02/14/2023    SUBJECTIVE/CHIEF COMPLAINT:   Hypertension, dyslipidemia, prediabetes, erectile problems.    HISTORY OF PRESENT ILLNESS:    This is a 59-year-old male here today with above concerns.  The patient does have hypertension and has been taking Lisinopril daily for his hypertension. In the past several months, he has been intermittently checking his blood pressures and systolic blood pressures have been in the 140s, diastolic in the 80s and 90s.  He states that he is exercising more.  He has not had any problems with chest pain, shortness of breath.  The patient also does have dyslipidemia and prediabetes, and has been managing his dyslipidemia and prediabetes with his diet.  He has not had any problems with polyuria, polydipsia or polyphagia.  The patient also does have erectile function issues.  In the past two months, he has been having difficult times with getting erection and sustaining erection.  He has not had any problems with decreased libido.  He has not had problems with depression, anxiety or fatigue. He has not taken medications for his erectile issues.    ALLERGIES AND MEDICATIONS:    Reviewed from Epic on 02/14/2023.    SOCIAL HISTORY:    Negative for cigarette smoking.    OBJECTIVE:    VITAL SIGNS:  Blood pressure 158/92, pulse 78, weight 87.5 kg.  CARDIOVASCULAR:  S1, S2, no murmurs, regular rate and rhythm.  LUNGS:  Clear to auscultation bilaterally.  Negative crackles, wheezes, rhonchi.  Unlabored respirations.  EXTREMITIES:  No edema.  Lipid panel with reflex, hemoglobin A1c and a fasting glucose is pending.    ASSESSMENT AND PLAN:    1.  Hypertension.  Discontinue Lisinopril.  Start Ramipril 10 mg p.o. daily.  Basic metabolic panel in 1 week.  Side effects of Ramipril reviewed with the patient. Blood pressure checks weekly.  The patient may follow up in one month as needed.   2.  Dyslipidemia.  Continue low-fat, low-cholesterol diet, 150 minutes of vigorous intensity  activity weekly.  We will notify the patient of lipid panel results and manage accordingly.  The patient may follow up in 6 months as needed.  3.  Prediabetes.  Caloric intake less than 2000 calories daily.  We will notify the patient of hemoglobin A1c results and manage accordingly. The patient may follow up in 6 months and as needed.  4.  Erectile dysfunction.  Sildenafil 20 mg 1-5 tablets p.o. 1-2 hours prior to sexual activity.  Side effects of Sildenafil reviewed with the patient including headache, flushing and rarely priapism.  The patient may follow up in one month and as needed.        Dictated By: Benigno Johnson MD  Signing Provider: MD CARLOTA Ulloa/melissa ([id number])   DD: 02/14/2023 10:22:57 AM TD: 02/14/2023 3:21:36 PM       None

## 2024-12-16 NOTE — BH PATIENT PROFILE - FUNCTIONAL ASSESSMENT - DAILY ACTIVITY 5.
Patient awake and alert and tolerating water without issue. VSS. Incision sites to back are CDI and closed with dermabond. Pt denies pain and nausea at this time.     Pt son updated on pt status.     MD Guy updated on pt HR and blood pressure. MD gave orders if pt is symptomatic, but at this time she is not.     Report called to Bea DANGELO. All pertinent pt information has been discussed. Pt transported to phase 2 in stable condition.    4 = No assist / stand by assistance

## 2024-12-16 NOTE — ED BEHAVIORAL HEALTH ASSESSMENT NOTE - DESCRIPTION
Cooperative in the ED    Vital Signs Last 24 Hrs  T(C): 36.6 (15 Dec 2024 22:49), Max: 36.6 (15 Dec 2024 18:27)  T(F): 97.8 (15 Dec 2024 22:49), Max: 97.9 (15 Dec 2024 18:27)  HR: 80 (15 Dec 2024 22:49) (69 - 80)  BP: 102/67 (15 Dec 2024 22:49) (102/67 - 133/89)  BP(mean): --  RR: 18 (15 Dec 2024 22:49) (18 - 18)  SpO2: 100% (15 Dec 2024 22:49) (98% - 100%)    Parameters below as of 15 Dec 2024 22:49  Patient On (Oxygen Delivery Method): room air lives alone, has father who lives in [place], previously employed, per pt was making $60k/year, left job because he wanted to become a  none

## 2024-12-16 NOTE — BH PATIENT PROFILE - NSPROEDAABILITYLEARN_GEN_A_NUR
-She is now off steroids.  She is on new medications for asthma and also reports that she was recently started on CPAP for sleep apnea.     none

## 2024-12-16 NOTE — ED BEHAVIORAL HEALTH ASSESSMENT NOTE - NSACTIVEVENT_PSY_ALL_CORE
Triggering events leading to humiliation, shame, and/or despair (e.g., Loss of relationship, financial or health status) (real or anticipated)/Legal problems/Current or pending social isolation/Substance intoxication or withdrawal/Pending incarceration or homelessness/Inadequate social supports/Perceived burden on family or others

## 2024-12-16 NOTE — ED BEHAVIORAL HEALTH ASSESSMENT NOTE - SUMMARY
34 year old male, street homeless, recently unemployed, hx alcohol use, no known PMH, PPH MDD, recent inpatient hospitalization at [facility] 12/7/24-12/13/24 for SI with plan, no known hx SA/SIB/violence, self-presenting to ED reporting recurring suicidal thoughts due to housing stressors (reportedly father refusing to have pt stay with him in [place]), with plan to jump off [facility].  on initial presentation.    On eval, patient reports ongoing psychosocial stressors, along with feelings of significant depression, hopelessness, self-isolation, ruminations, insomnia; reports ongoing suicidal ideation with plan and intent - feels regretful regarding past decisions and has nothing to live for. He states that last hospitalization was helpful with providing holding space, but pt remains unable to identify sources of support in the community and feels medication at current dose is ineffective. Impression is major depressive disorder, severe; given ongoing active SI with plan, pt will require psychiatric admission for stabilization and safety. Discussed expectations of readmission, incl limitations in housing referrals, delayed therapeutic response of SSRIs. Pt agrees to involvement of family for further support, wishes to explore change in medication regimen.     Plan:   - Hold for bed, pt agrees to voluntary admission (9.13)  - Continue constant observation in the ED, pt does not require 1:1 on inpatient unit, agrees to discuss with staff if symptoms worsen, reassess as needed  - Meds: continue sertraline 50 mg daily, increase or switch as tolreated  - CIWA protocol given alcohol use  - PRNs for agitation: Haldol 5 mg / Ativan 2 mg PO or IM  q6h 34 year old male, street homeless, recently unemployed, hx alcohol use, no known PMH, PPH MDD, recent inpatient hospitalization at [facility] 12/7/24-12/13/24 for SI with plan, no known hx SA/SIB/violence, self-presenting to ED reporting recurring suicidal thoughts due to housing stressors (reportedly father refusing to have pt stay with him in [place]), with plan to jump off [facility].  on initial presentation.    On eval, patient reports ongoing psychosocial stressors, along with feelings of significant depression, hopelessness, self-isolation, ruminations, insomnia; reports ongoing suicidal ideation with plan and intent - feels regretful regarding past decisions and has nothing to live for. He states that last hospitalization was helpful with providing holding space, but pt remains unable to identify sources of support in the community and feels medication at current dose is ineffective. Impression is major depressive disorder, severe; given ongoing active SI with plan, pt will require psychiatric admission for stabilization and safety. Discussed expectations of readmission, incl limitations in housing referrals, delayed therapeutic response of SSRIs. Pt agrees to involvement of family for further support, wishes to explore change in medication regimen.     Plan:   - Hold for bed, pt agrees to voluntary admission (9.13)  - Continue constant observation in the ED, pt does not require 1:1 on inpatient unit, agrees to discuss with staff if symptoms worsen, reassess as needed  - Meds: continue sertraline 50 mg daily, increase or switch as tolreated  - CIWA protocol; motivational interviewing regarding alcohol use  - PRNs for agitation: Haldol 5 mg / Ativan 2 mg PO or IM  q6h

## 2024-12-16 NOTE — ED BEHAVIORAL HEALTH ASSESSMENT NOTE - PSYCHIATRIC ISSUES AND PLAN (INCLUDE STANDING AND PRN MEDICATION)
continue sertraline 50 mg qD, uptitrate as tolerated; haldol 5 mg ativan 2 mg PO/IM PRN q8h for agitation/anxiety

## 2024-12-16 NOTE — ED BEHAVIORAL HEALTH ASSESSMENT NOTE - HPI (INCLUDE ILLNESS QUALITY, SEVERITY, DURATION, TIMING, CONTEXT, MODIFYING FACTORS, ASSOCIATED SIGNS AND SYMPTOMS)
Patient is 34 year old male, street homeless, recently unemployed, hx alcohol use, no known PMH, PPH MDD, [facility] for SI with plan, no known hx SA/SIB/violence, self-presenting to ED reporting recurring suicidal thoughts due to housing stressors (reportedly father refusing to have pt stay with him in [place]), with plan to jump off [address].  on initial presentation.    On eval, pt with dysphoric, angry affect, talkative and fairly engaged in interview. States that he has been continuing to struggle with navigating housing and finding employment since discharge on 12/13, was unable to stay with his father, states that he had been trying to find a ride for a state corrections exam (hoping to become a ) and asked a few friends but they turned him down, over the last few days has been feeling progressively more hopeless, with suicidal thoughts recurring since his last hospital admission, [place]iling and fall off into the water but brought himself to the ED instead. States that he has been depressed for several months since he lost his job and apartment, feels that he has nothing to live for - will be unable to fulfill his dream of getting a new job, having multiple kids, being . States that compared to being secure previously, he has been struggling with staying at the shelter system. Reports last hospital stay as helpful for making him feel safe, has continued taking sertraline 50 mg daily but does not feel medication is effective. Has been having trouble sleeping but has good appetite, denies HI or AH. Reports minimal alcohol use (despite discussing BAL close to 200 upon arrival), does not believe this to be a contributing factor to mood episode, denies other substance use. He denies prior self-harm or other suicide attempts, but reports feeling unsafe given ongoing low mood and difficulty re-establishing himself.    Discussed expectations for assistance with housing/other psychosocial factors, recommendation to involve family for further support, pt initially refuses but agrees to provide phone numbers for contact. Agrees with uptitration of SSRI. Pt denies hx alcohol withdrawal symptoms.    Collateral:  Yosef (father, [phone]): Unable to reach, left voicemail with callback number.   Called back - was informed that this was an incorrect number, contacted ED for additional phone numbers, pt corroborates that this is a correct number.    Chart reviewed. Patient was admitted to [facility] from 12/7/2024-12/13/2024, reporting suicidal ideation with plan to jump off [facility] after recent job loss, eviction from his apartment and losing possessions. Reported improved mood through inpatient stay, discharged with referral to shelter. Has father who lives in [place] (no contact info). Discharged on sertraline 50 mg daily.    PSYCKES: [id number]  Flags: High ER utilization  Dx: Adjustment Disorder * Alcohol related disorders *   Inpatient: No behavioral health services listed, multiple ER visits for headache, chest pain, etc. One prior visit for alcohol abuse with intoxication.   Outpatient: None    ISTOP:  This report was requested by: Eliza Cooper | Reference #: [id number]  No medications prescribed    Webcrims:   Case #[id number], Dylan Knight (born 1990), charges for strangulation 2nd degree, assault with intentional cause, criminal possession of weapon, criminal mischief. Next court date 1/14/2025 at [address]

## 2024-12-16 NOTE — ED BEHAVIORAL HEALTH ASSESSMENT NOTE - RISK ASSESSMENT
Pt is at elevated risk of suicide, reports SI with method and intent; homelessness; unemployment; limited social supports Pt is at elevated risk of suicide, reports SI with method and intent; homelessness; unemployment; limited social supports, substance use, legal hx

## 2024-12-17 DIAGNOSIS — F43.20 ADJUSTMENT DISORDER, UNSPECIFIED: ICD-10-CM

## 2024-12-17 DIAGNOSIS — F10.20 ALCOHOL DEPENDENCE, UNCOMPLICATED: ICD-10-CM

## 2024-12-17 PROCEDURE — 99232 SBSQ HOSP IP/OBS MODERATE 35: CPT

## 2024-12-17 RX ORDER — VITAMIN A 10000 UNIT
1 TABLET ORAL DAILY
Refills: 0 | Status: DISCONTINUED | OUTPATIENT
Start: 2024-12-17 | End: 2024-12-26

## 2024-12-17 RX ORDER — LORAZEPAM 1 MG/1
2 TABLET ORAL EVERY 8 HOURS
Refills: 0 | Status: DISCONTINUED | OUTPATIENT
Start: 2024-12-17 | End: 2024-12-17

## 2024-12-17 RX ORDER — HALOPERIDOL DECANOATE 50 MG/ML
5 INJECTION INTRAMUSCULAR EVERY 8 HOURS
Refills: 0 | Status: DISCONTINUED | OUTPATIENT
Start: 2024-12-17 | End: 2024-12-26

## 2024-12-17 RX ORDER — THIAMINE HYDROCHLORIDE 100 MG/ML
100 INJECTION, SOLUTION INTRAMUSCULAR; INTRAVENOUS DAILY
Refills: 0 | Status: COMPLETED | OUTPATIENT
Start: 2024-12-17 | End: 2024-12-20

## 2024-12-17 RX ORDER — DIPHENHYDRAMINE HCL 25 MG
50 TABLET ORAL EVERY 6 HOURS
Refills: 0 | Status: DISCONTINUED | OUTPATIENT
Start: 2024-12-17 | End: 2024-12-26

## 2024-12-17 RX ORDER — SERTRALINE HYDROCHLORIDE 25 MG/1
100 TABLET ORAL DAILY
Refills: 0 | Status: DISCONTINUED | OUTPATIENT
Start: 2024-12-18 | End: 2024-12-26

## 2024-12-17 RX ADMIN — SERTRALINE HYDROCHLORIDE 50 MILLIGRAM(S): 25 TABLET ORAL at 08:28

## 2024-12-17 NOTE — BH INPATIENT PSYCHIATRY ASSESSMENT NOTE - NSBHCHARTREVIEWLAB_PSY_A_CORE FT
Complete Blood Count + Automated Diff (12.15.24 @ 19:46)   WBC Count: 8.37 K/uL  RBC Count: 4.84 M/uL  Hemoglobin: 14.4 g/dL  Hematocrit: 42.8 %  Mean Cell Volume: 88.4 fL  Mean Cell Hemoglobin: 29.8 pg  Mean Cell Hemoglobin Conc: 33.6 g/dL  Red Cell Distrib Width: 12.6 %  Platelet Count - Automated: 272 K/uL  MPV: 10.6 fL  Auto Neutrophil #: 4.31 K/uL  Auto Lymphocyte #: 3.19 K/uL  Auto Monocyte #: 0.47 K/uL  Auto Eosinophil #: 0.34 K/uL  Auto Basophil #: 0.04 K/uL  Auto Neutrophil %: 51.5: Differential percentages must be correlated with absolute numbers for   clinical significance. %  Auto Lymphocyte %: 38.1 %  Auto Monocyte %: 5.6 %  Auto Eosinophil %: 4.1 %  Auto Basophil %: 0.5 %  Auto Immature Granulocyte %: 0.2: (Includes meta, myelo and promyelocytes). Mild elevations in immature   granulocytes may be seen with many inflammatory processes and pregnancy;   clinical correlation suggested. %  Nucleated RBC: 0 /100 WBCs

## 2024-12-17 NOTE — BH INPATIENT PSYCHIATRY ASSESSMENT NOTE - NSBHCHARTREVIEWVS_PSY_A_CORE FT
Vital Signs Last 24 Hrs  T(C): 36.7 (12-16-24 @ 16:34), Max: 36.7 (12-16-24 @ 16:34)  T(F): 98.1 (12-16-24 @ 16:34), Max: 98.1 (12-16-24 @ 16:34)  HR: 71 (12-16-24 @ 16:34) (71 - 71)  BP: 125/84 (12-16-24 @ 16:34) (125/84 - 125/84)  BP(mean): --  RR: 18 (12-16-24 @ 16:34) (18 - 18)  SpO2: 99% (12-16-24 @ 16:34) (99% - 99%)     Vital Signs Last 24 Hrs  T(C): 36.8 (12-18-24 @ 07:40), Max: 36.8 (12-18-24 @ 07:40)  T(F): 98.2 (12-18-24 @ 07:40), Max: 98.2 (12-18-24 @ 07:40)  HR: 59 (12-18-24 @ 07:40) (59 - 69)  BP: 123/73 (12-18-24 @ 07:40) (119/80 - 123/73)  BP(mean): --  RR: --  SpO2: --

## 2024-12-17 NOTE — CONSULT NOTE ADULT - SUBJECTIVE AND OBJECTIVE BOX
HOSPITALIST CONSULT    TERESSA SALINAS  34y, Male  Allergy: No Known Allergies        HPI:  Patient is 34 year old male, street homeless, recently unemployed, hx alcohol use, no known PMH, PPH MDD, recent inpatient hospitalization at [facility] 12/7/24-12/13/24 for SI with plan, no known hx SA/SIB/violence, self-presenting to ED reporting recurring suicidal thoughts due to housing stressors (reportedly father refusing to have pt stay with him in [place]), with plan to jump off [address].  on initial presentation.    On eval, pt with dysphoric, angry affect, talkative and fairly engaged in interview. States that he has been continuing to struggle with navigating housing and finding employment since discharge on 12/13, was unable to stay with his father, states that he had been trying to find a ride for a [employer] [facility] corrections exam (hoping to become a ) and asked a few friends but they turned him down, over the last few days has been feeling progressively more hopeless, with suicidal thoughts recurring since his last hospital admission, describes [place], lean on the railing and fall off into the water but brought himself to the ED instead. States that he has been depressed for several months since he lost his job and apartment, feels that he has nothing to live for - will be unable to fulfill his dream of getting a new job, having multiple kids, being . States that compared to being secure previously, he has been struggling with staying at the shelter system. Reports last hospital stay as helpful for making him feel safe, has continued taking sertraline 50 mg daily but does not feel medication is effective. Has been having trouble sleeping but has good appetite, denies HI or AH. Reports minimal alcohol use (despite discussing BAL close to 200 upon arrival), does not believe this to be a contributing factor to mood episode, denies other substance use. He denies prior self-harm or other suicide attempts, but reports feeling unsafe given ongoing low mood and difficulty re-establishing himself.    Discussed expectations for assistance with housing/other psychosocial factors, recommendation to involve family for further support, pt initially refuses but agrees to provide phone numbers for contact. Agrees with uptitration of SSRI. Pt denies hx alcohol withdrawal symptoms.    Collateral:  Yosef (father, [phone]): Unable to reach, left voicemail with callback number.   Called back - was informed that this was an incorrect number, contacted ED for additional phone numbers, pt corroborates that this is a correct number.    Chart reviewed. Patient was admitted to [facility] from 12/7/2024-12/13/2024, reporting suicidal ideation with plan to jump off [facility] after recent job loss, eviction from his apartment and losing possessions. Reported improved mood through inpatient stay, discharged with referral to shelter. Has father who lives in [place] (no contact info). Discharged on sertraline 50 mg daily.    PSYCKES: [id number]  Flags: High ER utilization  Dx: Adjustment Disorder * Alcohol related disorders *   Inpatient: No behavioral health services listed, multiple ER visits for headache, chest pain, etc. One prior visit for alcohol abuse with intoxication.   Outpatient: None     (17 Dec 2024 11:01)    FAMILY HISTORY:    PAST MEDICAL & SURGICAL HISTORY:  No pertinent past medical history      No significant past surgical history                ROS  General: Denies fevers, chills, nightsweats, weight loss  HEENT: Denies headache, rhinorrhea, sore throat, eye pain  CV: Denies CP, palpitations  PULM: Denies SOB, cough  GI: Denies abdominal pain, diarrhea  : Denies dysuria, hematuria  MSK: Denies arthralgias  SKIN: Denies rash   NEURO: Denies paresthesias, weakness    VITALS:  T(F): 98.1, Max: 98.1 (12-16-24 @ 16:34)  HR: 71  BP: 125/84  RR: 18Vital Signs Last 24 Hrs  T(C): 36.7 (16 Dec 2024 16:34), Max: 36.7 (16 Dec 2024 16:34)  T(F): 98.1 (16 Dec 2024 16:34), Max: 98.1 (16 Dec 2024 16:34)  HR: 71 (16 Dec 2024 16:34) (71 - 71)  BP: 125/84 (16 Dec 2024 16:34) (125/84 - 125/84)  RR: 18 (16 Dec 2024 16:34) (18 - 18)  SpO2: 99% (16 Dec 2024 16:34) (99% - 99%)    Parameters below as of 16 Dec 2024 16:34  Patient On (Oxygen Delivery Method): room air        PHYSICAL EXAM:  Gen: NAD, resting in bed  HEENT: Normocephalic, atraumatic  Neck: supple, no lymphadenopathy  CV: Regular rate & regular rhythm  Lungs: CTABL no wheeze  Abdomen: Soft, NTND+ BS present  Ext: Warm, well perfused no CCE  Neuro: non focal, awake, CN II-XII intact   Skin: no rash, no erythema    TESTS & MEASUREMENTS:                        14.4   8.37  )-----------( 272      ( 15 Dec 2024 19:46 )             42.8     12-15    142  |  105  |  13  ----------------------------<  110[H]  3.5   |  23  |  1.2    Ca    9.1      15 Dec 2024 19:46    TPro  6.6  /  Alb  4.3  /  TBili  0.3  /  DBili  x   /  AST  22  /  ALT  23  /  AlkPhos  51  12-15      LIVER FUNCTIONS - ( 15 Dec 2024 19:46 )  Alb: 4.3 g/dL / Pro: 6.6 g/dL / ALK PHOS: 51 U/L / ALT: 23 U/L / AST: 22 U/L / GGT: x           Urinalysis Basic - ( 15 Dec 2024 19:46 )    Color: x / Appearance: x / SG: x / pH: x  Gluc: 110 mg/dL / Ketone: x  / Bili: x / Urobili: x   Blood: x / Protein: x / Nitrite: x   Leuk Esterase: x / RBC: x / WBC x   Sq Epi: x / Non Sq Epi: x / Bacteria: x        COVID-19 PCR: NotDetec (15 Dec 2024 19:48)  COVID-19 PCR: NotDetec (07 Dec 2024 02:45)              CARDIOLOGY TESTING  12 Lead ECG:   Ventricular Rate 68 BPM    Atrial Rate 68 BPM    P-R Interval 174 ms    QRS Duration 112 ms    Q-T Interval 398 ms    QTC Calculation(Bazett) 423 ms    P Axis 48 degrees    R Axis -39 degrees    T Axis 53 degrees    Diagnosis Line Normal sinus rhythm  Left axis deviation  Incomplete right bundle branch block  Moderate voltage criteria for LVH, may be normal variant ( R in aVL , [place]  product )  Abnormal ECG    Confirmed by ROCIO ALDANA MD ([id number]) on 12/16/2024 7:00:18 AM (12-15-24 @ 20:29)  12 Lead ECG:   Ventricular Rate 71 BPM    Atrial Rate 71 BPM    P-R Interval 174 ms    QRS Duration 116 ms    Q-T Interval 386 ms    QTC Calculation(Bazett) 419 ms    P Axis 37 degrees    R Axis -37 degrees    T Axis 31 degrees    Diagnosis Line Normal sinus rhythm  Left axis deviation  Left ventricular hypertrophy with QRS widening  Nonspecific ST abnormality  Abnormal ECG    Confirmed by CHERI HERNANDEZ MDFIM ([id number]) on 12/6/2024 11:09:57 PM (12-06-24 @ 20:13)      MEDICATIONS  (STANDING):  folic acid 1 milliGRAM(s) Oral daily  thiamine 100 milliGRAM(s) Oral daily    MEDICATIONS  (PRN):  diphenhydrAMINE 50 milliGRAM(s) Oral every 6 hours PRN EPS  haloperidol     Tablet 5 milliGRAM(s) Oral every 8 hours PRN agitation  hydrOXYzine hydrochloride 50 milliGRAM(s) Oral every 6 hours PRN Anxiety and or insomnia  LORazepam     Tablet 2 milliGRAM(s) Oral every 8 hours PRN Anxiety            All available historical data has been reviewed

## 2024-12-17 NOTE — BH SOCIAL WORK INITIAL PSYCHOSOCIAL EVALUATION - NSBHMILITARYHX_PSY_ALL_CORE
----- Message from Kameron Mcdonough None

## 2024-12-17 NOTE — BH INPATIENT PSYCHIATRY ASSESSMENT NOTE - NSTXSUBMISINTERMD_PSY_ALL_CORE
Medication management and milieu therapy. Consult placed for the CATCH team to educate the signs of an opioid overdose and provide patients with Narcan.
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
## 2024-12-17 NOTE — BH SOCIAL WORK INITIAL PSYCHOSOCIAL EVALUATION - NSCMSPTSTRENGTHS_PSY_ALL_CORE
[facility] Division of Kidney Diseases & Hypertension  FOLLOW UP NOTE  [phone]--------------------------------------------------------------------------------  Chief Complaint:Hypertensive crisis      24 hour events/subjective:  Plan for HD today via [facility] (will be the 3rd session)      PAST HISTORY  --------------------------------------------------------------------------------  No significant changes to PMH, PSH, FHx, SHx, unless otherwise noted    ALLERGIES & MEDICATIONS  --------------------------------------------------------------------------------  Allergies    labetalol (Other (Mild))    Intolerances      Standing Inpatient Medications  ARIPiprazole 10 milliGRAM(s) Oral daily  carvedilol 25 milliGRAM(s) Oral every 12 hours  chlorhexidine 4% Liquid 1 Application(s) Topical <User Schedule>  cloNIDine 0.3 milliGRAM(s) Oral three times a day  heparin   Injectable 7500 Unit(s) SubCutaneous every 8 hours  hydrALAZINE 100 milliGRAM(s) Oral three times a day  isosorbide   dinitrate Tablet (ISORDIL) 20 milliGRAM(s) Oral three times a day  lidocaine 2% Injectable 50 milliLiter(s) Local Injection once  NIFEdipine XL 90 milliGRAM(s) Oral daily  pantoprazole    Tablet 40 milliGRAM(s) Oral before breakfast  sevelamer carbonate 800 milliGRAM(s) Oral three times a day with meals  spironolactone 100 milliGRAM(s) Oral daily    PRN Inpatient Medications  cyclobenzaprine 10 milliGRAM(s) Oral three times a day PRN  guaiFENesin Oral Liquid (Sugar-Free) 100 milliGRAM(s) Oral every 6 hours PRN  OLANZapine Injectable 5 milliGRAM(s) IntraMuscular every 8 hours PRN  sodium chloride 0.65% Nasal 1 Spray(s) Both Nostrils two times a day PRN      REVIEW OF SYSTEMS  --------------------------------------------------------------------------------  Gen: No  fevers/chills  Skin: No rashes  Head/Eyes/Ears/Mouth: No headache; Normal hearing; Normal vision w/o blurriness  Respiratory: No dyspnea, cough, wheezing, hemoptysis  CV: No chest pain, PND, orthopnea  GI: No abdominal pain, diarrhea, constipation, nausea, vomiting  : No increased frequency, dysuria, hematuria, nocturia  MSK: No joint pain/swelling; no back pain; no edema  Neuro: No dizziness/lightheadedness, weakness, seizures, numbness, tingling      All other systems were reviewed and are negative, except as noted.    VITALS/PHYSICAL EXAM  --------------------------------------------------------------------------------  T(C): 36.8 (10-12-22 @ 05:52), Max: 36.8 (10-11-22 @ 13:12)  HR: 90 (10-12-22 @ 05:52) (86 - 90)  BP: 169/95 (10-12-22 @ 05:52) (130/74 - 169/95)  RR: 18 (10-12-22 @ 05:52) (18 - 18)  SpO2: 95% (10-12-22 @ 05:52) (95% - 98%)  Wt(kg): --        10-11-22 @ 07:01  -  10-12-22 @ 07:00  --------------------------------------------------------  IN: 680 mL / OUT: 0 mL / NET: 680 mL      Physical Exam:  	Gen: NAD  	HEENT: on room air  	Pulm: CTA B/L  	CV: normal S1S2; no rub  	Abd: soft                      Back : deferred  	: No jose  	LE: improved LE edema  	Skin: Warm, without rashes  	Vascular access: RIFABIAN tunneled dialysis catheter in situ, [place] ARROYO +    LABS/STUDIES  --------------------------------------------------------------------------------              8.2    8.47  >-----------<  305      [10-12-22 @ 07:38]              27.4     136  |  95  |  55  ----------------------------<  117      [10-12-22 @ 07:20]  4.0   |  22  |  11.49        Ca     10.0     [10-12-22 @ 07:20]      Phos  5.3     [10-12-22 @ 07:20]      PT/INR: PT 12.2 , INR 1.06       [10-12-22 @ 07:41]  PTT: 26.2       [10-12-22 @ 07:41]      Creatinine Trend:  SCr 11.49 [10-12 @ 07:20]  SCr 12.51 [10-10 @ 08:23]  SCr 10.89 [10-09 @ 08:54]  SCr 9.14 [10-08 @ 07:00]  SCr 10.12 [10-07 @ 00:21]        PTH -- (Ca 9.2)      [10-06-22 @ 09:57]   356       Motivated/Self-reliant
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
## 2024-12-17 NOTE — CONSULT NOTE ADULT - ASSESSMENT
ASSESSMENT  34y M admitted with Suicidal ideation        PROBLEMS    Suicidal ideation:  -mgmt per primary team    Patient currently has no acute medical issues, please recall PRN [id number]

## 2024-12-17 NOTE — BH INPATIENT PSYCHIATRY ASSESSMENT NOTE - NSBHMETABOLIC_PSY_ALL_CORE_FT
BMI: BMI (kg/m2): 36.2 (12-15-24 @ 18:27)  HbA1c:   Glucose:   BP: 125/84 (12-16-24 @ 16:34) (102/67 - 133/89)Vital Signs Last 24 Hrs  T(C): 36.7 (12-16-24 @ 16:34), Max: 36.7 (12-16-24 @ 16:34)  T(F): 98.1 (12-16-24 @ 16:34), Max: 98.1 (12-16-24 @ 16:34)  HR: 71 (12-16-24 @ 16:34) (71 - 71)  BP: 125/84 (12-16-24 @ 16:34) (125/84 - 125/84)  BP(mean): --  RR: 18 (12-16-24 @ 16:34) (18 - 18)  SpO2: 99% (12-16-24 @ 16:34) (99% - 99%)      Lipid Panel:  BMI: BMI (kg/m2): 36.2 (12-15-24 @ 18:27)  HbA1c:   Glucose:   BP: 123/73 (12-18-24 @ 07:40) (102/67 - 133/89)Vital Signs Last 24 Hrs  T(C): 36.8 (12-18-24 @ 07:40), Max: 36.8 (12-18-24 @ 07:40)  T(F): 98.2 (12-18-24 @ 07:40), Max: 98.2 (12-18-24 @ 07:40)  HR: 59 (12-18-24 @ 07:40) (59 - 69)  BP: 123/73 (12-18-24 @ 07:40) (119/80 - 123/73)  BP(mean): --  RR: --  SpO2: --      Lipid Panel: Date/Time: 12-18-24 @ 07:20  Cholesterol, Serum: 181  LDL Cholesterol Calculated: 117  HDL Cholesterol, Serum: 56  Total Cholesterol/HDL Ration Measurement: --  Triglycerides, Serum: 38

## 2024-12-17 NOTE — BH INPATIENT PSYCHIATRY ASSESSMENT NOTE - ATTENDING COMMENTS
Patient presents with worsening suicidal ideation in the context of spending several nights in a homeless shelter. During previous hospitalization, he denied alcohol use, however on admission during this hospitalization BAL was 198. Pt reports drinking 4-5 shots after donating blood twice a week. Patient is at low risk of withdrawal as he was discharged 5 days ago. CATCH team consulted to discuss alcohol treatment.

## 2024-12-17 NOTE — BH INPATIENT PSYCHIATRY ASSESSMENT NOTE - CURRENT MEDICATION
MEDICATIONS  (STANDING):  folic acid 1 milliGRAM(s) Oral daily  sertraline 50 milliGRAM(s) Oral daily  thiamine 100 milliGRAM(s) Oral daily    MEDICATIONS  (PRN):  diphenhydrAMINE 50 milliGRAM(s) Oral every 6 hours PRN EPS  haloperidol     Tablet 5 milliGRAM(s) Oral every 8 hours PRN agitation  hydrOXYzine hydrochloride 50 milliGRAM(s) Oral every 6 hours PRN Anxiety and or insomnia  LORazepam     Tablet 2 milliGRAM(s) Oral every 8 hours PRN Anxiety   MEDICATIONS  (STANDING):  folic acid 1 milliGRAM(s) Oral daily  sertraline 100 milliGRAM(s) Oral daily  thiamine 100 milliGRAM(s) Oral daily    MEDICATIONS  (PRN):  diphenhydrAMINE 50 milliGRAM(s) Oral every 6 hours PRN EPS  haloperidol     Tablet 5 milliGRAM(s) Oral every 8 hours PRN agitation  hydrOXYzine hydrochloride 50 milliGRAM(s) Oral every 6 hours PRN Anxiety and or insomnia  LORazepam     Tablet 2 milliGRAM(s) Oral every 8 hours PRN Anxiety

## 2024-12-17 NOTE — BH INPATIENT PSYCHIATRY ASSESSMENT NOTE - RISK ASSESSMENT
Pt is at elevated risk of suicide, reports SI with method and intent; homelessness; unemployment; limited social supports, substance use, legal hx
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
## 2024-12-17 NOTE — BH SOCIAL WORK INITIAL PSYCHOSOCIAL EVALUATION - NSPTSTATEDGOAL_PSY_ALL_CORE
Patient wants to secure housing and employment.  Patient wants to get sober so he can get a job and stable housing.

## 2024-12-17 NOTE — BH INPATIENT PSYCHIATRY ASSESSMENT NOTE - DESCRIPTION
lives alone, has father who lives in [place], previously employed, per pt was making $60k/year, left job because he wanted to become a

## 2024-12-18 LAB
A1C WITH ESTIMATED AVERAGE GLUCOSE RESULT: 5.8 % — HIGH (ref 4–5.6)
CHOLEST SERPL-MCNC: 181 MG/DL — SIGNIFICANT CHANGE UP
ESTIMATED AVERAGE GLUCOSE: 120 MG/DL — HIGH (ref 68–114)
HDLC SERPL-MCNC: 56 MG/DL — SIGNIFICANT CHANGE UP
LIPID PNL WITH DIRECT LDL SERPL: 117 MG/DL — HIGH
NON HDL CHOLESTEROL: 125 MG/DL — SIGNIFICANT CHANGE UP
TRIGL SERPL-MCNC: 38 MG/DL — SIGNIFICANT CHANGE UP
TSH SERPL-MCNC: 4.84 UIU/ML — HIGH (ref 0.27–4.2)

## 2024-12-18 PROCEDURE — 99231 SBSQ HOSP IP/OBS SF/LOW 25: CPT

## 2024-12-18 RX ADMIN — Medication 1 MILLIGRAM(S): at 08:48

## 2024-12-18 RX ADMIN — THIAMINE HYDROCHLORIDE 100 MILLIGRAM(S): 100 INJECTION, SOLUTION INTRAMUSCULAR; INTRAVENOUS at 08:48

## 2024-12-18 RX ADMIN — SERTRALINE HYDROCHLORIDE 100 MILLIGRAM(S): 25 TABLET ORAL at 08:48

## 2024-12-18 NOTE — BH INPATIENT PSYCHIATRY PROGRESS NOTE - NSBHASSESSSUMMFT_PSY_ALL_CORE
Patient is 34 year old male, street homeless, recently unemployed, hx alcohol use, no known PMH, PPH MDD, recent inpatient hospitalization at [facility] 12/[facility]-12/13/24 for SI with plan, no known hx SA/SIB/violence, self-presenting to ED reporting recurring suicidal thoughts due to housing stressors (reportedly father refusing to have pt stay with him in [place]), with plan to jump off [facility].  on initial presentation.    Patient seen and evaluated 12/18/24. As per nursing report no acute events or PRN’s over night. On approach patient calm and cooperative, pleasant. No agitation or aggression noted. Patient expresses an improved mood. Zoloft increased. Denies any side effect/adverse reactions. No side effects/adverse reactions noted. Patient denies any suicidal ideations. Patient considering Rehab. Appears optimistic. CATCH team on board. Signed all forms yesterday for referrals to be sent out. Patient denies any A/V hallucinations. No evidence of psychosis noted. Patient visible on the unit. Awaiting Rehab placement.        #Admit    #Plan 12/18/24    #MDD  -Zoloft 100mg daily    #Alcohol use  -CATCH Consult  -Thiamine  -Folate    -Hydroxyzine 50mg Q6 PRN for anxiety or insomnia  -Haldol 5mg Q8 PRN for agitation or psychosis  -Benadryl 50mg Q8 PRN for EPS  -Lorazepam 2mg Q8 PRN for aggression    #Agitation  -for agitation not amenable to verbal redirection, may give haldol 5 mg q6h prn, ativan 2 mg q6h prn, benadryl 50 mg q6h prn with escalation to IM if pt is a danger to self or/and others with repeat EKG to ensure QTc <500 ms.

## 2024-12-18 NOTE — BH INPATIENT PSYCHIATRY PROGRESS NOTE - NSBHMETABOLIC_PSY_ALL_CORE_FT
BMI: BMI (kg/m2): 36.2 (12-15-24 @ 18:27)  HbA1c:   Glucose:   BP: 123/73 (12-18-24 @ 07:40) (102/67 - 133/89)Vital Signs Last 24 Hrs  T(C): 36.8 (12-18-24 @ 07:40), Max: 36.8 (12-18-24 @ 07:40)  T(F): 98.2 (12-18-24 @ 07:40), Max: 98.2 (12-18-24 @ 07:40)  HR: 59 (12-18-24 @ 07:40) (59 - 69)  BP: 123/73 (12-18-24 @ 07:40) (119/80 - 123/73)  BP(mean): --  RR: --  SpO2: --      Lipid Panel: Date/Time: 12-18-24 @ 07:20  Cholesterol, Serum: 181  LDL Cholesterol Calculated: 117  HDL Cholesterol, Serum: 56  Total Cholesterol/HDL Ration Measurement: --  Triglycerides, Serum: 38

## 2024-12-18 NOTE — BH INPATIENT PSYCHIATRY PROGRESS NOTE - NSBHFUPINTERVALHXFT_PSY_A_CORE
58 Patient seen and evaluated 12/18/24. As per nursing report no acute events or PRN’s over night. On approach patient calm and cooperative, pleasant. No agitation or aggression noted. Patient expresses an improved mood. Zoloft increased. Denies any side effect/adverse reactions. No side effects/adverse reactions noted. Patient denies any suicidal ideations. Patient considering Rehab. Appears optimistic. CATCH team on board. Signed all forms yesterday for referrals to be sent out. Patient denies any A/V hallucinations. No evidence of psychosis noted. Patient visible on the unit. Awaiting Rehab placement.

## 2024-12-18 NOTE — BH INPATIENT PSYCHIATRY PROGRESS NOTE - NSBHCHARTREVIEWVS_PSY_A_CORE FT
Vital Signs Last 24 Hrs  T(C): 36.8 (12-18-24 @ 07:40), Max: 36.8 (12-18-24 @ 07:40)  T(F): 98.2 (12-18-24 @ 07:40), Max: 98.2 (12-18-24 @ 07:40)  HR: 59 (12-18-24 @ 07:40) (59 - 69)  BP: 123/73 (12-18-24 @ 07:40) (119/80 - 123/73)  BP(mean): --  RR: --  SpO2: --

## 2024-12-19 DIAGNOSIS — F43.20 ADJUSTMENT DISORDER, UNSPECIFIED: ICD-10-CM

## 2024-12-19 DIAGNOSIS — R45.851 SUICIDAL IDEATIONS: ICD-10-CM

## 2024-12-19 DIAGNOSIS — F32.2 MAJOR DEPRESSIVE DISORDER, SINGLE EPISODE, SEVERE WITHOUT PSYCHOTIC FEATURES: ICD-10-CM

## 2024-12-19 DIAGNOSIS — Z59.01 SHELTERED HOMELESSNESS: ICD-10-CM

## 2024-12-19 DIAGNOSIS — E66.3 OVERWEIGHT: ICD-10-CM

## 2024-12-19 PROCEDURE — 99231 SBSQ HOSP IP/OBS SF/LOW 25: CPT

## 2024-12-19 RX ADMIN — SERTRALINE HYDROCHLORIDE 100 MILLIGRAM(S): 25 TABLET ORAL at 08:24

## 2024-12-19 RX ADMIN — THIAMINE HYDROCHLORIDE 100 MILLIGRAM(S): 100 INJECTION, SOLUTION INTRAMUSCULAR; INTRAVENOUS at 08:24

## 2024-12-19 RX ADMIN — Medication 1 MILLIGRAM(S): at 08:24

## 2024-12-19 SDOH — ECONOMIC STABILITY - HOUSING INSECURITY: SHELTERED HOMELESSNESS: Z59.01

## 2024-12-19 NOTE — BH INPATIENT PSYCHIATRY PROGRESS NOTE - NSBHFUPINTERVALHXFT_PSY_A_CORE
Patient seen and evaluated 12/19/24. As per nursing report no acute events or PRN’s over night. On approach patient calm and cooperative, pleasant. No agitation or aggression noted. Patient continues to express an improved mood. Zoloft recently increased. Will continue to monitor. Patient denies any suicidal ideations. Patient still considering Rehab. Appears optimistic. CATCH team on board. Signed all forms for referrals to be sent out. Patient denies any A/V hallucinations. No evidence of psychosis noted. Patient visible on the unit engaging with peers, watching TV in the TV room and attending groups. Awaiting Rehab placement.

## 2024-12-19 NOTE — BH INPATIENT PSYCHIATRY PROGRESS NOTE - NSBHCHARTREVIEWVS_PSY_A_CORE FT
Vital Signs Last 24 Hrs  T(C): 36.5 (12-19-24 @ 08:44), Max: 37.4 (12-18-24 @ 15:35)  T(F): 97.7 (12-19-24 @ 08:44), Max: 99.4 (12-18-24 @ 15:35)  HR: 69 (12-19-24 @ 08:44) (69 - 100)  BP: 123/83 (12-19-24 @ 08:44) (123/83 - 135/60)  BP(mean): --  RR: --  SpO2: --

## 2024-12-19 NOTE — BH INPATIENT PSYCHIATRY PROGRESS NOTE - NSBHASSESSSUMMFT_PSY_ALL_CORE
Patient is 34 year old male, street homeless, recently unemployed, hx alcohol use, no known PMH, PPH MDD, recent inpatient hospitalization at [facility] 12/7/24-12/13/24 for SI with plan, no known hx SA/SIB/violence, self-presenting to ED reporting recurring suicidal thoughts due to housing stressors (reportedly father refusing to have pt stay with him in [place]), with plan to jump off [facility].  on initial presentation.    Patient seen and evaluated 12/19/24. Patient continues to express an improved mood. Patient denies any suicidal ideations. Patient still considering Rehab. Appears optimistic. CATCH team on board. Patient denies any A/V hallucinations. No evidence of psychosis noted. Patient visible on the unit engaging with peers, watching TV in the TV room and attending groups. Awaiting Rehab placement.    #Admit    #Plan 12/19/24    #MDD  -Zoloft 100mg daily    #Alcohol use  -CATCH Consult  -Thiamine  -Folate    -Hydroxyzine 50mg Q6 PRN for anxiety or insomnia  -Haldol 5mg Q8 PRN for agitation or psychosis  -Benadryl 50mg Q8 PRN for EPS  -Lorazepam 2mg Q8 PRN for aggression    #Agitation  -for agitation not amenable to verbal redirection, may give haldol 5 mg q6h prn, ativan 2 mg q6h prn, benadryl 50 mg q6h prn with escalation to IM if pt is a danger to self or/and others with repeat EKG to ensure QTc <500 ms.

## 2024-12-19 NOTE — BH INPATIENT PSYCHIATRY PROGRESS NOTE - NSBHMETABOLIC_PSY_ALL_CORE_FT
BMI: BMI (kg/m2): 36.2 (12-15-24 @ 18:27)  HbA1c: A1C with Estimated Average Glucose Result: 5.8 % (12-18-24 @ 07:20)    Glucose:   BP: 123/83 (12-19-24 @ 08:44) (119/80 - 135/60)Vital Signs Last 24 Hrs  T(C): 36.5 (12-19-24 @ 08:44), Max: 37.4 (12-18-24 @ 15:35)  T(F): 97.7 (12-19-24 @ 08:44), Max: 99.4 (12-18-24 @ 15:35)  HR: 69 (12-19-24 @ 08:44) (69 - 100)  BP: 123/83 (12-19-24 @ 08:44) (123/83 - 135/60)  BP(mean): --  RR: --  SpO2: --      Lipid Panel: Date/Time: 12-18-24 @ 07:20  Cholesterol, Serum: 181  LDL Cholesterol Calculated: 117  HDL Cholesterol, Serum: 56  Total Cholesterol/HDL Ration Measurement: --  Triglycerides, Serum: 38

## 2024-12-20 PROCEDURE — 99231 SBSQ HOSP IP/OBS SF/LOW 25: CPT

## 2024-12-20 RX ADMIN — Medication 1 MILLIGRAM(S): at 08:23

## 2024-12-20 RX ADMIN — THIAMINE HYDROCHLORIDE 100 MILLIGRAM(S): 100 INJECTION, SOLUTION INTRAMUSCULAR; INTRAVENOUS at 08:23

## 2024-12-20 RX ADMIN — SERTRALINE HYDROCHLORIDE 100 MILLIGRAM(S): 25 TABLET ORAL at 08:23

## 2024-12-20 NOTE — BH INPATIENT PSYCHIATRY PROGRESS NOTE - NSBHASSESSSUMMFT_PSY_ALL_CORE
Patient is 34 year old male, street homeless, recently unemployed, hx alcohol use, no known PMH, PPH MDD, recent inpatient hospitalization at [facility] 12/7/24-12/13/24 for SI with plan, no known hx SA/SIB/violence, self-presenting to ED reporting recurring suicidal thoughts due to housing stressors (reportedly father refusing to have pt stay with him in [place]), with plan to jump off [facility].  on initial presentation.    Patient seen and evaluated 12/20/24. Patient continues to express an improved mood. Patient denies any suicidal/homicidal ideations. Able to contract for safety. Patient still considering Rehab. Appears optimistic. CATCH team on board. Patient denies any A/V hallucinations. No evidence of psychosis noted. Patient visible on the unit engaging with peers, watching TV in the TV room and attending groups. Awaiting Rehab placement.    #Admit    #Plan 12/20/24    #MDD  -Zoloft 100mg daily    #Alcohol use  -CATCH Consult  -Thiamine  -Folate    -Hydroxyzine 50mg Q6 PRN for anxiety or insomnia  -Haldol 5mg Q8 PRN for agitation or psychosis  -Benadryl 50mg Q8 PRN for EPS  -Lorazepam 2mg Q8 PRN for aggression    #Agitation  -for agitation not amenable to verbal redirection, may give haldol 5 mg q6h prn, ativan 2 mg q6h prn, benadryl 50 mg q6h prn with escalation to IM if pt is a danger to self or/and others with repeat EKG to ensure QTc <500 ms.

## 2024-12-20 NOTE — BH INPATIENT PSYCHIATRY PROGRESS NOTE - NSBHFUPINTERVALHXFT_PSY_A_CORE
Patient seen and evaluated 12/20/24. As per nursing report no acute events or PRN’s over night. On approach patient calm and cooperative, pleasant. No agitation or aggression noted. Patient continues to express an improved mood. Zoloft recently increased. Will continue to monitor and titrate accordingly. Patient denies any suicidal/homicidal ideations. Able to contract for safety. Patient still considering Rehab. Appears optimistic. CATCH team on board. Signed all forms for referrals to be sent out. Patient denies any A/V hallucinations. No evidence of psychosis noted. Patient visible on the unit engaging with peers, watching TV in the TV room and attending groups. Awaiting Rehab placement.

## 2024-12-20 NOTE — BH INPATIENT PSYCHIATRY PROGRESS NOTE - NSBHCHARTREVIEWVS_PSY_A_CORE FT
Vital Signs Last 24 Hrs  T(C): 36.7 (12-20-24 @ 09:10), Max: 37.1 (12-19-24 @ 16:12)  T(F): 98 (12-20-24 @ 09:10), Max: 98.8 (12-19-24 @ 16:12)  HR: 55 (12-20-24 @ 09:10) (55 - 62)  BP: 112/77 (12-20-24 @ 09:10) (112/77 - 126/78)  BP(mean): --  RR: --  SpO2: --

## 2024-12-20 NOTE — BH INPATIENT PSYCHIATRY PROGRESS NOTE - NSBHMETABOLIC_PSY_ALL_CORE_FT
BMI: BMI (kg/m2): 36.2 (12-15-24 @ 18:27)  HbA1c: A1C with Estimated Average Glucose Result: 5.8 % (12-18-24 @ 07:20)    Glucose:   BP: 112/77 (12-20-24 @ 09:10) (112/77 - 135/60)Vital Signs Last 24 Hrs  T(C): 36.7 (12-20-24 @ 09:10), Max: 37.1 (12-19-24 @ 16:12)  T(F): 98 (12-20-24 @ 09:10), Max: 98.8 (12-19-24 @ 16:12)  HR: 55 (12-20-24 @ 09:10) (55 - 62)  BP: 112/77 (12-20-24 @ 09:10) (112/77 - 126/78)  BP(mean): --  RR: --  SpO2: --      Lipid Panel: Date/Time: 12-18-24 @ 07:20  Cholesterol, Serum: 181  LDL Cholesterol Calculated: 117  HDL Cholesterol, Serum: 56  Total Cholesterol/HDL Ration Measurement: --  Triglycerides, Serum: 38

## 2024-12-21 RX ADMIN — SERTRALINE HYDROCHLORIDE 100 MILLIGRAM(S): 25 TABLET ORAL at 09:08

## 2024-12-21 RX ADMIN — Medication 1 MILLIGRAM(S): at 09:07

## 2024-12-21 NOTE — BH INPATIENT PSYCHIATRY PROGRESS NOTE - NSBHMETABOLIC_PSY_ALL_CORE_FT
BMI: BMI (kg/m2): 36.2 (12-15-24 @ 18:27)  HbA1c: A1C with Estimated Average Glucose Result: 5.8 % (12-18-24 @ 07:20)    Glucose:   BP: 133/88 (12-21-24 @ 10:46) (102/73 - 135/60)Vital Signs Last 24 Hrs  T(C): 36.7 (12-21-24 @ 10:46), Max: 36.8 (12-20-24 @ 16:07)  T(F): 98.1 (12-21-24 @ 10:46), Max: 98.3 (12-20-24 @ 16:07)  HR: 71 (12-21-24 @ 10:46) (66 - 71)  BP: 133/88 (12-21-24 @ 10:46) (102/73 - 133/88)  BP(mean): --  RR: --  SpO2: --      Lipid Panel: Date/Time: 12-18-24 @ 07:20  Cholesterol, Serum: 181  LDL Cholesterol Calculated: 117  HDL Cholesterol, Serum: 56  Total Cholesterol/HDL Ration Measurement: --  Triglycerides, Serum: 38

## 2024-12-21 NOTE — BH INPATIENT PSYCHIATRY PROGRESS NOTE - NSBHCHARTREVIEWVS_PSY_A_CORE FT
Vital Signs Last 24 Hrs  T(C): 36.7 (12-21-24 @ 10:46), Max: 36.8 (12-20-24 @ 16:07)  T(F): 98.1 (12-21-24 @ 10:46), Max: 98.3 (12-20-24 @ 16:07)  HR: 71 (12-21-24 @ 10:46) (66 - 71)  BP: 133/88 (12-21-24 @ 10:46) (102/73 - 133/88)  BP(mean): --  RR: --  SpO2: --

## 2024-12-21 NOTE — BH INPATIENT PSYCHIATRY PROGRESS NOTE - NSBHASSESSSUMMFT_PSY_ALL_CORE
Mr. Knight is 34 year old male, street homeless, recently unemployed, hx alcohol use, no known PMH, PPH MDD, recent inpatient hospitalization at [facility] 12/7/24-12/13/24 for SI with plan, no known hx SA/SIB/violence, self-presenting to ED reporting recurring suicidal thoughts due to housing stressors (reportedly father refusing to have pt stay with him in [place]), with plan to jump off [facility].  on initial presentation.    Patient seen and evaluated 12/20/24. Patient continues to express an improved mood. Patient denies any suicidal/homicidal ideations. Able to contract for safety. Patient still considering Rehab. Appears optimistic. CATCH team on board. Patient denies any A/V hallucinations. No evidence of psychosis noted. Patient visible on the unit engaging with peers, watching TV in the TV room and attending groups. Awaiting Rehab placement.    #MDD  -Zoloft 100mg daily    #Alcohol use  -CATCH Consult  -Thiamine  -Folate    -Hydroxyzine 50mg Q6 PRN for anxiety or insomnia  -Haldol 5mg Q8 PRN for agitation or psychosis  -Benadryl 50mg Q8 PRN for EPS  -Lorazepam 2mg Q8 PRN for aggression    #Agitation  -for agitation not amenable to verbal redirection, may give haldol 5 mg q6h prn, ativan 2 mg q6h prn, benadryl 50 mg q6h prn with escalation to IM if pt is a danger to self or/and others with repeat EKG to ensure QTc <500 ms.

## 2024-12-21 NOTE — BH INPATIENT PSYCHIATRY PROGRESS NOTE - NSBHFUPINTERVALHXFT_PSY_A_CORE
Chart reviewed, patient seen. No acute events or PRN’s over night. On approach patient calm and cooperative, pleasant. No agitation or aggression noted. Patient continues to express an improved mood. Patient denies any suicidal/homicidal ideations. Appears optimistic. CATCH team on board. Signed all forms for referrals to be sent out. Patient denies any A/V hallucinations. No evidence of psychosis noted. Awaiting Rehab placement.

## 2024-12-22 RX ADMIN — Medication 1 MILLIGRAM(S): at 08:26

## 2024-12-22 RX ADMIN — SERTRALINE HYDROCHLORIDE 100 MILLIGRAM(S): 25 TABLET ORAL at 08:26

## 2024-12-23 PROCEDURE — 99231 SBSQ HOSP IP/OBS SF/LOW 25: CPT

## 2024-12-23 RX ORDER — SERTRALINE HYDROCHLORIDE 25 MG/1
1 TABLET ORAL
Qty: 0 | Refills: 0 | DISCHARGE
Start: 2024-12-23

## 2024-12-23 RX ORDER — VITAMIN A 10000 UNIT
1 TABLET ORAL
Qty: 0 | Refills: 0 | DISCHARGE
Start: 2024-12-23

## 2024-12-23 RX ORDER — LORAZEPAM 1 MG/1
2 TABLET ORAL EVERY 8 HOURS
Refills: 0 | Status: DISCONTINUED | OUTPATIENT
Start: 2024-12-25 | End: 2024-12-26

## 2024-12-23 RX ADMIN — SERTRALINE HYDROCHLORIDE 100 MILLIGRAM(S): 25 TABLET ORAL at 08:37

## 2024-12-23 RX ADMIN — Medication 1 MILLIGRAM(S): at 08:37

## 2024-12-23 NOTE — BH INPATIENT PSYCHIATRY DISCHARGE NOTE - NSDCCPCAREPLAN_GEN_ALL_CORE_FT
PRINCIPAL DISCHARGE DIAGNOSIS  Diagnosis: Severe major depression without psychotic features  Assessment and Plan of Treatment: Patient to continue with prescribed medication and follow up with outpatient      SECONDARY DISCHARGE DIAGNOSES  Diagnosis: Alcohol use disorder, moderate, dependence  Assessment and Plan of Treatment: Patient to continue with prescribed medication and follow up with outpatient    Diagnosis: Adjustment disorder  Assessment and Plan of Treatment: Patient to continue with prescribed medication and follow up with outpatient

## 2024-12-23 NOTE — BH TREATMENT PLAN - NSBHPRIMARYDX_PSY_ALL_CORE
Severe major depression without psychotic features    
Severe major depression without psychotic features

## 2024-12-23 NOTE — BH INPATIENT PSYCHIATRY DISCHARGE NOTE - NSBHSUBSTUSESTATEMENT_PSY_A_CORE
----- Message from Rosalina Washington sent at 12/28/2022  7:59 AM CST -----  .....Type:  Sooner Apoointment Request    Caller is requesting a sooner appointment.  Caller declined first available appointment listed below.  Caller will not accept being placed on the waitlist and is requesting a message be sent to doctor.  Name of Caller:Marsha   When is the first available appointment?N/A  Symptoms:Followup  Would the patient rather a call back or a response via [facility]? Yes  Best Call Back Number:[phone]  Additional Information: Pt would like an appt in March         .

## 2024-12-23 NOTE — BH INPATIENT PSYCHIATRY DISCHARGE NOTE - NSBHMSEJUDGE_PSY_A_CORE
Case reviewed with Dr smith who inquired as to long term Fair

## 2024-12-23 NOTE — BH INPATIENT PSYCHIATRY PROGRESS NOTE - NSBHFUPINTERVALHXFT_PSY_A_CORE
Patient seen and evaluated 12/23/24. As per nursing report no acute events. On approach patient calm and cooperative, pleasant. No agitation or aggression noted. Patient continues to express an improved mood. Has responded well to the increase in Zoloft. Patient denies any suicidal/homicidal ideations. Able to contract for safety. Patient optimistic about going to Rehab. Discussed coping skills and continuing treatment in Rehab. Patient denies any A/V hallucinations. No evidence of psychosis noted.  Patient visible on the unit engaging with peers, watching TV in the TV room and attending groups. Compliant with medication. Does not warrant continued Inpatient hospitalization. Patient does not present a risk to self or others at this time. Patient psychiatrically stable for discharge to Rehab. Awaiting rehab placement. Patient seen and evaluated 12/23/24. As per nursing report no acute events. On approach patient calm and cooperative, pleasant. No agitation or aggression noted. Patient continues to express an improved mood. Has responded well to the increase in Zoloft. Patient denies any suicidal/homicidal ideations. Able to contract for safety. Accepted to Rehab. Patient optimistic. Discussed coping skills and continuing treatment in Rehab. Patient denies any A/V hallucinations. No evidence of psychosis noted. Patient visible on the unit engaging with peers, watching TV in the TV room and attending groups. Compliant with medication. Does not warrant continued Inpatient hospitalization. Patient does not present a risk to self or others at this time. Patient psychiatrically stable for discharge to Rehab.

## 2024-12-23 NOTE — BH INPATIENT PSYCHIATRY PROGRESS NOTE - NSBHASSESSSUMMFT_PSY_ALL_CORE
Patient is 34 year old male, street homeless, recently unemployed, hx alcohol use, no known PMH, PPH MDD, recent inpatient hospitalization at [facility] 12/7/24-12/13/24 for SI with plan, no known hx SA/SIB/violence, self-presenting to ED reporting recurring suicidal thoughts due to housing stressors (reportedly father refusing to have pt stay with him in [place]), with plan to jump off [facility].  on initial presentation.    Patient seen and evaluated 12/23/24. As per nursing report no acute events. On approach patient calm and cooperative, pleasant. No agitation or aggression noted. Patient continues to express an improved mood. Has responded well to the increase in Zoloft. Patient denies any suicidal/homicidal ideations. Able to contract for safety. Patient optimistic about going to Rehab. Discussed coping skills and continuing treatment in Rehab. Patient denies any A/V hallucinations. No evidence of psychosis noted.  Patient visible on the unit engaging with peers, watching TV in the TV room and attending groups. Compliant with medication. Does not warrant continued Inpatient hospitalization. Patient does not present a risk to self or others at this time. Patient psychiatrically stable for discharge to Rehab. Awaiting rehab placement.    #Admit    #Plan 12/20/24    #MDD  -Zoloft 100mg daily    #Alcohol use  -CATCH Consult  -Thiamine  -Folate    -Hydroxyzine 50mg Q6 PRN for anxiety or insomnia  -Haldol 5mg Q8 PRN for agitation or psychosis  -Benadryl 50mg Q8 PRN for EPS  -Lorazepam 2mg Q8 PRN for aggression    #Agitation  -for agitation not amenable to verbal redirection, may give haldol 5 mg q6h prn, ativan 2 mg q6h prn, benadryl 50 mg q6h prn with escalation to IM if pt is a danger to self or/and others with repeat EKG to ensure QTc <500 ms. Patient is 34 year old male, street homeless, recently unemployed, hx alcohol use, no known PMH, PPH MDD, recent inpatient hospitalization at [facility] 12/7/24-12/13/24 for SI with plan, no known hx SA/SIB/violence, self-presenting to ED reporting recurring suicidal thoughts due to housing stressors (reportedly father refusing to have pt stay with him in [place]), with plan to jump off [facility].  on initial presentation.    Patient seen and evaluated 12/23/24. As per nursing report no acute events. On approach patient calm and cooperative, pleasant. No agitation or aggression noted. Patient continues to express an improved mood. Has responded well to the increase in Zoloft. Patient denies any suicidal/homicidal ideations. Able to contract for safety. Accepted to Rehab. Patient optimistic. Discussed coping skills and continuing treatment in Rehab. Patient denies any A/V hallucinations. No evidence of psychosis noted. Patient visible on the unit engaging with peers, watching TV in the TV room and attending groups. Compliant with medication. Does not warrant continued Inpatient hospitalization. Patient does not present a risk to self or others at this time. Patient psychiatrically stable for discharge to Rehab.     #Admit    #Plan 12/20/24    #MDD  -Zoloft 100mg daily    #Alcohol use  -CATCH Consult  -Thiamine  -Folate    -Hydroxyzine 50mg Q6 PRN for anxiety or insomnia  -Haldol 5mg Q8 PRN for agitation or psychosis  -Benadryl 50mg Q8 PRN for EPS  -Lorazepam 2mg Q8 PRN for aggression    #Agitation  -for agitation not amenable to verbal redirection, may give haldol 5 mg q6h prn, ativan 2 mg q6h prn, benadryl 50 mg q6h prn with escalation to IM if pt is a danger to self or/and others with repeat EKG to ensure QTc <500 ms. Patient is 34 year old male, street homeless, recently unemployed, hx alcohol use, no known PMH, PPH MDD, recent inpatient hospitalization at [facility] 12/7/24-12/13/24 for SI with plan, no known hx SA/SIB/violence, self-presenting to ED reporting recurring suicidal thoughts due to housing stressors (reportedly father refusing to have pt stay with him in [place]), with plan to jump off [facility].  on initial presentation.    Patient seen and evaluated 12/23/24. As per nursing report no acute events. On approach patient calm and cooperative, pleasant. No agitation or aggression noted. Patient continues to express an improved mood. Has responded well to the increase in Zoloft. Patient denies any suicidal/homicidal ideations. Able to contract for safety. Accepted to Rehab. Patient optimistic. Discussed coping skills and continuing treatment in Rehab. Patient denies any A/V hallucinations. No evidence of psychosis noted. Patient visible on the unit engaging with peers, watching TV in the TV room and attending groups. Compliant with medication. Does not warrant continued Inpatient hospitalization. Patient does not present a risk to self or others at this time. Patient psychiatrically stable for discharge to Rehab.     #Admit    #Plan 12/23/24    #MDD  -Zoloft 100mg daily    #Alcohol use  -CATCH Consult  -Thiamine  -Folate    -Hydroxyzine 50mg Q6 PRN for anxiety or insomnia  -Haldol 5mg Q8 PRN for agitation or psychosis  -Benadryl 50mg Q8 PRN for EPS  -Lorazepam 2mg Q8 PRN for aggression    #Agitation  -for agitation not amenable to verbal redirection, may give haldol 5 mg q6h prn, ativan 2 mg q6h prn, benadryl 50 mg q6h prn with escalation to IM if pt is a danger to self or/and others with repeat EKG to ensure QTc <500 ms.

## 2024-12-23 NOTE — BH INPATIENT PSYCHIATRY DISCHARGE NOTE - NSDCMRMEDTOKEN_GEN_ALL_CORE_FT
sertraline 50 mg oral tablet: 1 tab(s) orally once a day x 14 days Continue until told otherwise by your provider.   folic acid 1 mg oral tablet: 1 tab(s) orally once a day Continue until told otherwise by your provider.  sertraline 100 mg oral tablet: 1 tab(s) orally once a day Continue until told otherwise by your provider.

## 2024-12-23 NOTE — BH INPATIENT PSYCHIATRY DISCHARGE NOTE - NSBHASSESSSUMMFT_PSY_ALL_CORE
Patient is 34 year old male, street homeless, recently unemployed, hx alcohol use, no known PMH, PPH MDD, recent inpatient hospitalization at [facility] 12/7/24-12/13/24 for SI with plan, no known hx SA/SIB/violence, self-presenting to ED reporting recurring suicidal thoughts due to housing stressors (reportedly father refusing to have pt stay with him in [place]), with plan to jump off [facility].  on initial presentation.    Patient seen and evaluated 12/23/24. As per nursing report no acute events. On approach patient calm and cooperative, pleasant. No agitation or aggression noted. Patient continues to express an improved mood. Has responded well to the increase in Zoloft. Patient denies any suicidal/homicidal ideations. Able to contract for safety. Accepted to Rehab. Patient optimistic. Discussed coping skills and continuing treatment in Rehab. Patient denies any A/V hallucinations. No evidence of psychosis noted. Patient visible on the unit engaging with peers, watching TV in the TV room and attending groups. Compliant with medication. Does not warrant continued Inpatient hospitalization. Patient does not present a risk to self or others at this time. Patient psychiatrically stable for discharge to Rehab.     #Admit    #Plan 12/23/24    #MDD  -Zoloft 100mg daily    #Alcohol use  -CATCH Consult  -Thiamine  -Folate     Patient is 34 year old male, street homeless, recently unemployed, hx alcohol use, no known PMH, PPH MDD, recent inpatient hospitalization at [facility] 12/7/24-12/13/24 for SI with plan, no known hx SA/SIB/violence, self-presenting to ED reporting recurring suicidal thoughts due to housing stressors (reportedly father refusing to have pt stay with him in [place]), with plan to jump off [facility].  on initial presentation.    Patient seen and evaluated 12/24/24. As per nursing report no acute events. On approach patient calm and cooperative, pleasant. No agitation or aggression noted. Discharge was scheduled for yesterday, then postponed to Thursday 12/26/24 due to transportation issues. Patient continues to express an improved mood. Has responded well to the medication. Patient denies any suicidal/homicidal ideations. Able to contract for safety. Accepted to Rehab. Patient optimistic. Patient denies any A/V hallucinations. No evidence of psychosis noted. Patient visible on the unit engaging with peers, watching TV in the TV room and attending groups. Compliant with medication. Does not warrant continued Inpatient hospitalization. Patient does not present a risk to self or others at this time. Patient psychiatrically stable for discharge to Rehab. Anticipated discharge 12/26/24.     #Admit    #Plan 12/24/24    #MDD  -Zoloft 100mg daily    #Alcohol use  -CATCH Consult  -Thiamine  -Folate    -Hydroxyzine 50mg Q6 PRN for anxiety or insomnia  -Haldol 5mg Q8 PRN for agitation or psychosis  -Benadryl 50mg Q8 PRN for EPS  -Lorazepam 2mg Q8 PRN for aggression    #Agitation  -for agitation not amenable to verbal redirection, may give haldol 5 mg q6h prn, ativan 2 mg q6h prn, benadryl 50 mg q6h prn with escalation to IM if pt is a danger to self or/and others with repeat EKG to ensure QTc <500 ms.

## 2024-12-23 NOTE — BH INPATIENT PSYCHIATRY PROGRESS NOTE - NSBHCHARTREVIEWVS_PSY_A_CORE FT
Vital Signs Last 24 Hrs  T(C): 37.3 (12-22-24 @ 16:29), Max: 37.3 (12-22-24 @ 16:29)  T(F): 99.1 (12-22-24 @ 16:29), Max: 99.1 (12-22-24 @ 16:29)  HR: 71 (12-22-24 @ 16:29) (71 - 71)  BP: 111/73 (12-22-24 @ 16:29) (111/73 - 111/73)  BP(mean): --  RR: --  SpO2: --     Vital Signs Last 24 Hrs  T(C): 36.5 (12-23-24 @ 10:54), Max: 37.3 (12-22-24 @ 16:29)  T(F): 97.7 (12-23-24 @ 10:54), Max: 99.1 (12-22-24 @ 16:29)  HR: 57 (12-23-24 @ 10:54) (57 - 71)  BP: 127/92 (12-23-24 @ 10:54) (111/73 - 127/92)  BP(mean): --  RR: --  SpO2: --

## 2024-12-23 NOTE — BH TREATMENT PLAN - NSTXSUBMISINTERMD_PSY_ALL_CORE
Medication management and milieu therapy. Consult placed for the CATCH team to educate the signs of an opioid overdose and provide patients with Narcan. 
Medication management and milieu therapy. Consult placed for the CATCH team to educate the signs of an opioid overdose and provide patients with Narcan.

## 2024-12-23 NOTE — BH INPATIENT PSYCHIATRY DISCHARGE NOTE - NSBHMSEHYG_PSY_A_CORE
From: Mariposa Castañeda  Sent: 7/14/2018 8:39 AM CDT  Subject: Medication Renewal Request    Margret Duarte. Jacinta Fulton would like a refill of the following medications:      AmLODIPine Besylate 5 MG Oral Tab Amaris Dietrich MD]    Preferred pharmacy: [facility]/PHARMACY #[id number] - [address], [phone]    Comment:      Medication renewals requested in this message routed separately:     LISINOPRIL 20 MG Oral Tab [Augusto De Leon MD]
LOV 6/5/2018
Please see encounter from 7/8  This medication is for BP and should come from PCP.    Please forward to PCP  Thanks
Routed to Dr. Kaycee Ruano
Fair

## 2024-12-23 NOTE — BH DISCHARGE NOTE NURSING/SOCIAL WORK/PSYCH REHAB - NSCDUDCCRISIS_PSY_A_CORE
[facility]  1 [phone]) [facility] ([phone])  Text "WELL" to 76296  Website: [email or web address]  [facility]  1 (217) LIFEN[facility] ([phone])/986 Suicide and Crisis Lifeline

## 2024-12-23 NOTE — BH TREATMENT PLAN - NSTXSUICIDINTERPR_PSY_ALL_CORE
MS will continue to offer support and provide encouragement to attend RT sessions and other group activities on the unit
CA will offer support and provide ongoing encouragement to attend RT sessions to develop coping skills and leisure interests while on the unit

## 2024-12-23 NOTE — BH INPATIENT PSYCHIATRY DISCHARGE NOTE - NSBHFUPINTERVALHXFT_PSY_A_CORE
Patient seen and evaluated 12/23/24. As per nursing report no acute events. On approach patient calm and cooperative, pleasant. No agitation or aggression noted. Patient continues to express an improved mood. Has responded well to the increase in Zoloft. Patient denies any suicidal/homicidal ideations. Able to contract for safety. Accepted to Rehab. Patient optimistic. Discussed coping skills and continuing treatment in Rehab. Patient denies any A/V hallucinations. No evidence of psychosis noted. Patient visible on the unit engaging with peers, watching TV in the TV room and attending groups. Compliant with medication. Does not warrant continued Inpatient hospitalization. Patient does not present a risk to self or others at this time. Patient psychiatrically stable for discharge to Rehab.  Patient seen and evaluated 12/24/24. As per nursing report no acute events. On approach patient calm and cooperative, pleasant. No agitation or aggression noted. Discharge was scheduled for yesterday, then postponed to Thursday 12/26/24 due to transportation issues. Patient continues to express an improved mood. Has responded well to the medication. Patient denies any suicidal/homicidal ideations. Able to contract for safety. Accepted to Rehab. Patient optimistic. Patient denies any A/V hallucinations. No evidence of psychosis noted. Patient visible on the unit engaging with peers, watching TV in the TV room and attending groups. Compliant with medication. Does not warrant continued Inpatient hospitalization. Patient does not present a risk to self or others at this time. Patient psychiatrically stable for discharge to Rehab. Anticipated discharge 12/26/24.

## 2024-12-23 NOTE — BH DISCHARGE NOTE NURSING/SOCIAL WORK/PSYCH REHAB - PATIENT PORTAL LINK FT
You can access the FollowMyHealth Patient Portal offered by [facility] by registering at the following website: [email or web address]. By joining [facility]’s FollowMyHealth portal, you will also be able to view your health information using other applications (apps) compatible with our system.

## 2024-12-23 NOTE — BH CHART NOTE - NSEVENTNOTEFT_PSY_ALL_CORE
Writer made aware discharge now on hold due to transportation issues. Patient scheduled for discharge Thursday 12/26/24.
D/C today to rehab facility. Transportation set up. Patient appeared to be in good spirits today. Insight and judgment have improved. Denies suicidal/ homicidal ideations. Patient able to contract for safety. Compliant with medication. Does not warrant continued Inpatient hospitalization. Writer reviewed D/C papers with patient. Patient verbalized understanding. Patient does not appear to be of risk to self or others at this time. Patient psychiatrically stable for discharge to Rehab.
Patient arrived to the unit. He endorses [place], denies active plan or intent on the unit. He reports drinking 4 shots prior to admission. He drinks about 4 drinks twice a week after donating plasma. He denies other substance use including nicotine use. He denies medication or food allergies or dietary preferences. No indication for constant observation. Will restart sertraline 50 mg. 
18-Oct-2024 02:32

## 2024-12-23 NOTE — BH INPATIENT PSYCHIATRY DISCHARGE NOTE - NSBHMETABOLIC_PSY_ALL_CORE_FT
BMI: BMI (kg/m2): 36.2 (12-15-24 @ 18:27)  HbA1c: A1C with Estimated Average Glucose Result: 5.8 % (12-18-24 @ 07:20)    Glucose:   BP: 111/73 (12-22-24 @ 16:29) (102/73 - 147/93)Vital Signs Last 24 Hrs  T(C): 37.3 (12-22-24 @ 16:29), Max: 37.3 (12-22-24 @ 16:29)  T(F): 99.1 (12-22-24 @ 16:29), Max: 99.1 (12-22-24 @ 16:29)  HR: 71 (12-22-24 @ 16:29) (71 - 71)  BP: 111/73 (12-22-24 @ 16:29) (111/73 - 111/73)  BP(mean): --  RR: --  SpO2: --      Lipid Panel: Date/Time: 12-18-24 @ 07:20  Cholesterol, Serum: 181  LDL Cholesterol Calculated: 117  HDL Cholesterol, Serum: 56  Total Cholesterol/HDL Ration Measurement: --  Triglycerides, Serum: 38
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
## 2024-12-23 NOTE — BH TREATMENT PLAN - NSTXPLANTHERAPYSESSIONSFT_PSY_ALL_CORE
12-17-24  Type of therapy: Coping skills, Creative arts therapy, Inspiration and motiviation, Leisure development, Self esteem, Social skills training, Stress management, Symptom management  Type of session: Individual  Level of patient participation: Resistance to participation  Duration of participation: Less than 15 minutes  Therapy conducted by: Psych rehab  Therapy Summary: Pt is familiar to the unit and currently spending time resting in their room with the lights off. Pt mood is low, chart hx shows pt displays SI regarding various life stressors and was unable to contract with the shelter upon discharge. Pt will need increased encouragement to attend RT sessions or to associate with peers and watch TV in the dayroom    12-18-24  Type of therapy: Coping skills, Transition planning  Type of session: Group  Level of patient participation: Attentive, Participated with encouragement  Duration of participation: 45 minutes  Therapy conducted by: Social work  Therapy Summary: Patient attended the discharge planning group with  and peers. Topics explored include: Plans for discharge (follow up appointments), effective coping skills and social supports. The importance of medication management was discussed.  answered questions and provided support.     Dylan participated in the dialogue. He appeared open to reviewing the materials presented. He was able to identify "warning signs" and triggers of some his mental health struggles such as lack of money and support. He was open to staff and peer feedback.    12-23-24  Type of therapy: Coping skills, Creative arts therapy, Inspiration and motiviation, Leisure development, Self esteem, Social skills training, Stress management, Symptom management  Type of session: Individual  Level of patient participation: Quiet, Resistance to participation  Duration of participation: Less than 15 minutes  Therapy conducted by: Psych rehab  Therapy Summary: Pt spends most of his time in his room. Demonstrates slight improvement by becoming briefly visible on the unit, watching TV in the dayroom yet does not interact with surrounding peers. He has attended a social work discussion last week where he was attentive and briefly participatory to material presented. Pt's mood is depressed but when staff approaches to check-in, pt states he's "all good." Will need ongoing encouragement to attend RT sessions as well.  
  12-12-24  Type of therapy: Dialectical behavior therapy, Coping skills, Positive well-being  Type of session: Group  --  --  Therapy conducted by: Social work  Therapy Summary: Pt did not attend the five ways to personal well-being group with SW intern and peers. The CLEAN skill was reviewed which identifies ways that we can promote our overall health and well-being. The following  skills were reviewed: "Connect, Learn, Exercise, Acts of Kindness, and Notice". Pts offered support and feedback while SW intern facilitated the group.      SW intern encouraged Pt to attend the DBT coping skills group today with peers. The benefits of attending groups were discussed. Patient declined group attendance and is encouraged to attend future sessions.    12-17-24  Type of therapy: Coping skills, Creative arts therapy, Inspiration and motiviation, Leisure development, Self esteem, Social skills training, Stress management, Symptom management  Type of session: Individual  Level of patient participation: Resistance to participation  Duration of participation: Less than 15 minutes  Therapy conducted by: Psych rehab  Therapy Summary: Pt is familiar to the unit and currently spending time resting in their room with the lights off. Pt mood is low, chart hx shows pt displays SI regarding various life stressors and was unable to contract with the shelter upon discharge. Pt will need increased encouragement to attend RT sessions or to associate with peers and watch TV in the dayroom

## 2024-12-23 NOTE — BH INPATIENT PSYCHIATRY PROGRESS NOTE - NSBHMETABOLIC_PSY_ALL_CORE_FT
BMI: BMI (kg/m2): 36.2 (12-15-24 @ 18:27)  HbA1c: A1C with Estimated Average Glucose Result: 5.8 % (12-18-24 @ 07:20)    Glucose:   BP: 111/73 (12-22-24 @ 16:29) (102/73 - 147/93)Vital Signs Last 24 Hrs  T(C): 37.3 (12-22-24 @ 16:29), Max: 37.3 (12-22-24 @ 16:29)  T(F): 99.1 (12-22-24 @ 16:29), Max: 99.1 (12-22-24 @ 16:29)  HR: 71 (12-22-24 @ 16:29) (71 - 71)  BP: 111/73 (12-22-24 @ 16:29) (111/73 - 111/73)  BP(mean): --  RR: --  SpO2: --      Lipid Panel: Date/Time: 12-18-24 @ 07:20  Cholesterol, Serum: 181  LDL Cholesterol Calculated: 117  HDL Cholesterol, Serum: 56  Total Cholesterol/HDL Ration Measurement: --  Triglycerides, Serum: 38   BMI: BMI (kg/m2): 36.2 (12-15-24 @ 18:27)  HbA1c: A1C with Estimated Average Glucose Result: 5.8 % (12-18-24 @ 07:20)    Glucose:   BP: 127/92 (12-23-24 @ 10:54) (102/73 - 147/93)Vital Signs Last 24 Hrs  T(C): 36.5 (12-23-24 @ 10:54), Max: 37.3 (12-22-24 @ 16:29)  T(F): 97.7 (12-23-24 @ 10:54), Max: 99.1 (12-22-24 @ 16:29)  HR: 57 (12-23-24 @ 10:54) (57 - 71)  BP: 127/92 (12-23-24 @ 10:54) (111/73 - 127/92)  BP(mean): --  RR: --  SpO2: --      Lipid Panel: Date/Time: 12-18-24 @ 07:20  Cholesterol, Serum: 181  LDL Cholesterol Calculated: 117  HDL Cholesterol, Serum: 56  Total Cholesterol/HDL Ration Measurement: --  Triglycerides, Serum: 38

## 2024-12-23 NOTE — BH INPATIENT PSYCHIATRY DISCHARGE NOTE - HOSPITAL COURSE
Patient is 34 year old male, street homeless, recently unemployed, hx alcohol use, no known PMH, PPH MDD, [facility]12[facility]13[facility] for SI with plan, no known hx SA/SIB/violence, self-presenting to ED reporting recurring suicidal thoughts due to housing stressors (reportedly father refusing to have pt stay with him in [place]), with plan to jump off [employer].  on initial presentation.    On eval, pt with dysphoric, angry affect, talkative and fairly engaged in interview. States that he has been continuing to struggle with navigating housing and finding employment since discharge on 12/13, was unable to stay with his father, states that he had been trying to find a ride for a state corrections exam (hoping to become a ) and asked a few friends but they turned him down, over the last few days has been feeling progressively more hopeless, with suicidal thoughts recurring since his last hospital admission, [place]iling and fall off into the water but brought himself to the ED instead. States that he has been depressed for several months since he lost his job and apartment, feels that he has nothing to live for - will be unable to fulfill his dream of getting a new job, having multiple kids, being . States that compared to being secure previously, he has been struggling with staying at the shelter system. Reports last hospital stay as helpful for making him feel safe, has continued taking sertraline 50 mg daily but does not feel medication is effective. Has been having trouble sleeping but has good appetite, denies HI or AH. Reports minimal alcohol use (despite discussing BAL close to 200 upon arrival), does not believe this to be a contributing factor to mood episode, denies other substance use. He denies prior self-harm or other suicide attempts, but reports feeling unsafe given ongoing low mood and difficulty re-establishing himself.    Discussed expectations for assistance with housing/other psychosocial factors, recommendation to involve family for further support, pt initially refuses but agrees to provide phone numbers for contact. Agrees with uptitration of SSRI. Pt denies hx alcohol withdrawal symptoms.    Collateral:  Yosef (father, [phone]): Unable to reach, left voicemail with callback number.   Called back - was informed that this was an incorrect number, contacted ED for additional phone numbers, pt corroborates that this is a correct number.    Chart reviewed. Patient was admitted to [facility] from 12/7/2024-12/13/2024, reporting suicidal ideation with plan to jump off [place] [employer] after recent job loss, eviction from his apartment and losing possessions. Reported improved mood through inpatient stay, discharged with referral to shelter. Has father who lives in [place] (no contact info). Discharged on sertraline 50 mg daily.    Patient seen and evaluated on IPP 12/18/24. As per nursing report no acute events or PRN’s over night. On approach patient calm and cooperative, pleasant. No agitation or aggression noted. Patient expresses an improved mood. Zoloft increased. Denies any side effect/adverse reactions. No side effects/adverse reactions noted. Patient denies any suicidal ideations. Patient considering Rehab. Appears optimistic. CATCH team on board. Signed all forms yesterday for referrals to be sent out. Patient denies any A/V hallucinations. No evidence of psychosis noted. Patient visible on the unit. Awaiting Rehab placement. Patient is 34 year old male, street homeless, recently unemployed, hx alcohol use, no known PMH, PPH MDD, [facility]12[facility]13[facility]24 for SI with plan, no known hx SA/SIB/violence, self-presenting to ED reporting recurring suicidal thoughts due to housing stressors (reportedly father refusing to have pt stay with him in [place]), with plan to jump off [employer].  on initial presentation.    On eval, pt with dysphoric, angry affect, talkative and fairly engaged in interview. States that he has been continuing to struggle with navigating housing and finding employment since discharge on 12/13, was unable to stay with his father, states that he had been trying to find a ride for a state corrections exam (hoping to become a ) and asked a few friends but they turned him down, over the last few days has been feeling progressively more hopeless, with suicidal thoughts recurring since his last hospital admission, describes plan to go [place] the [place], lean on the railing and fall off into the water but brought himself to the ED instead. States that he has been depressed for several months since he lost his job and apartment, feels that he has nothing to live for - will be unable to fulfill his dream of getting a new job, having multiple kids, being . States that compared to being secure previously, he has been struggling with staying at the shelter system. Reports last hospital stay as helpful for making him feel safe, has continued taking sertraline 50 mg daily but does not feel medication is effective. Has been having trouble sleeping but has good appetite, denies HI or AH. Reports minimal alcohol use (despite discussing BAL close to 200 upon arrival), does not believe this to be a contributing factor to mood episode, denies other substance use. He denies prior self-harm or other suicide attempts, but reports feeling unsafe given ongoing low mood and difficulty re-establishing himself.    Discussed expectations for assistance with housing/other psychosocial factors, recommendation to involve family for further support, pt initially refuses but agrees to provide phone numbers for contact. Agrees with uptitration of SSRI. Pt denies hx alcohol withdrawal symptoms.    Collateral:  Yosef (father, [phone]): Unable to reach, left voicemail with callback number.   Called back - was informed that this was an incorrect number, contacted ED for additional phone numbers, pt corroborates that this is a correct number.    Chart reviewed. Patient was admitted to [facility] from 12/7/2024-12/13/2024, reporting suicidal ideation with plan to jump off [place] [employer] after recent job loss, eviction from his apartment and losing possessions. Reported improved mood through inpatient stay, discharged with referral to shelter. Has father who lives in [place] (no contact info). Discharged on sertraline 50 mg daily.    Patient seen and evaluated on IPP 12/18/24. As per nursing report no acute events or PRN’s over night. On approach patient calm and cooperative, pleasant. No agitation or aggression noted. Patient expresses an improved mood. Zoloft increased. Denies any side effect/adverse reactions. No side effects/adverse reactions noted. Patient denies any suicidal ideations. Patient considering Rehab. Appears optimistic. CATCH team on board. Signed all forms yesterday for referrals to be sent out. Patient denies any A/V hallucinations. No evidence of psychosis noted. Patient visible on the unit. Awaiting Rehab placement.    Patient seen and evaluated 12/23/24. As per nursing report no acute events. On approach patient calm and cooperative, pleasant. No agitation or aggression noted. Patient continues to express an improved mood. Has responded well to the increase in Zoloft. Patient denies any suicidal/homicidal ideations. Able to contract for safety. Accepted to Rehab. Patient optimistic. Discussed coping skills and continuing treatment in Rehab. Patient denies any A/V hallucinations. No evidence of psychosis noted. Patient visible on the unit engaging with peers, watching TV in the TV room and attending groups. Compliant with medication. Does not warrant continued Inpatient hospitalization. Patient does not present a risk to self or others at this time. Patient psychiatrically stable for discharge to Rehab. Patient is 34 year old male, street homeless, recently unemployed, hx alcohol use, no known PMH, PPH MDD, [facility] for SI with plan, no known hx SA/SIB/violence, self-presenting to ED reporting recurring suicidal thoughts due to housing stressors (reportedly father refusing to have pt stay with him in [place]), with plan to jump off [employer].  on initial presentation.    On eval, pt with dysphoric, angry affect, talkative and fairly engaged in interview. States that he has been continuing to struggle with navigating housing and finding employment since discharge on 12/13, was unable to stay with his father, states that he had been trying to find a ride for a state corrections exam (hoping to become a ) and asked a few friends but they turned him down, over the last few days has been feeling progressively more hopeless, with suicidal thoughts recurring since his last hospital admission, describes plan to [place], lean on the railing and fall off into the water but brought himself to the ED instead. States that he has been depressed for several months since he lost his job and apartment, feels that he has nothing to live for - will be unable to fulfill his dream of getting a new job, having multiple kids, being . States that compared to being secure previously, he has been struggling with staying at the shelter system. Reports last hospital stay as helpful for making him feel safe, has continued taking sertraline 50 mg daily but does not feel medication is effective. Has been having trouble sleeping but has good appetite, denies HI or AH. Reports minimal alcohol use (despite discussing BAL close to 200 upon arrival), does not believe this to be a contributing factor to mood episode, denies other substance use. He denies prior self-harm or other suicide attempts, but reports feeling unsafe given ongoing low mood and difficulty re-establishing himself.    Discussed expectations for assistance with housing/other psychosocial factors, recommendation to involve family for further support, pt initially refuses but agrees to provide phone numbers for contact. Agrees with uptitration of SSRI. Pt denies hx alcohol withdrawal symptoms.    Collateral:  Yosef (father, [phone]): Unable to reach, left voicemail with callback number.   Called back - was informed that this was an incorrect number, contacted ED for additional phone numbers, pt corroborates that this is a correct number.    Chart reviewed. Patient was admitted to [facility] from 12/7/2024-12/13/2024, reporting suicidal ideation with plan to jump off [place] [employer] after recent job loss, eviction from his apartment and losing possessions. Reported improved mood through inpatient stay, discharged with referral to shelter. Has father who lives in [place] (no contact info). Discharged on sertraline 50 mg daily.    Patient seen and evaluated on IPP 12/18/24. As per nursing report no acute events or PRN’s over night. On approach patient calm and cooperative, pleasant. No agitation or aggression noted. Patient expresses an improved mood. Zoloft increased. Denies any side effect/adverse reactions. No side effects/adverse reactions noted. Patient denies any suicidal ideations. Patient considering Rehab. Appears optimistic. CATCH team on board. Signed all forms yesterday for referrals to be sent out. Patient denies any A/V hallucinations. No evidence of psychosis noted. Patient visible on the unit. Awaiting Rehab placement.    Patient seen and evaluated 12/24/24. As per nursing report no acute events. On approach patient calm and cooperative, pleasant. No agitation or aggression noted. Discharge was scheduled for yesterday, then postponed to Thursday 12/26/24 due to transportation issues. Patient continues to express an improved mood. Has responded well to the medication. Patient denies any suicidal/homicidal ideations. Able to contract for safety. Accepted to Rehab. Patient optimistic. Patient denies any A/V hallucinations. No evidence of psychosis noted. Patient visible on the unit engaging with peers, watching TV in the TV room and attending groups. Compliant with medication. Does not warrant continued Inpatient hospitalization. Patient does not present a risk to self or others at this time. Patient psychiatrically stable for discharge to Rehab. Anticipated discharge 12/26/24.

## 2024-12-24 PROCEDURE — 99231 SBSQ HOSP IP/OBS SF/LOW 25: CPT

## 2024-12-24 RX ADMIN — Medication 1 MILLIGRAM(S): at 09:38

## 2024-12-24 RX ADMIN — SERTRALINE HYDROCHLORIDE 100 MILLIGRAM(S): 25 TABLET ORAL at 09:38

## 2024-12-24 NOTE — BH INPATIENT PSYCHIATRY PROGRESS NOTE - NSBHCHARTREVIEWVS_PSY_A_CORE FT
Vital Signs Last 24 Hrs  T(C): 36.5 (12-23-24 @ 16:35), Max: 36.5 (12-23-24 @ 10:54)  T(F): 97.7 (12-23-24 @ 16:35), Max: 97.7 (12-23-24 @ 10:54)  HR: 58 (12-23-24 @ 16:35) (57 - 58)  BP: 116/74 (12-23-24 @ 16:35) (116/74 - 127/92)  BP(mean): --  RR: --  SpO2: --

## 2024-12-24 NOTE — BH INPATIENT PSYCHIATRY PROGRESS NOTE - NSBHFUPINTERVALHXFT_PSY_A_CORE
Patient seen and evaluated 12/24/24. As per nursing report no acute events. On approach patient calm and cooperative, pleasant. No agitation or aggression noted. Discharge was scheduled for yesterday, then postponed to Thursday 12/26/24 due to transportation issues. Patient continues to express an improved mood. Has responded well to the medication. Patient denies any suicidal/homicidal ideations. Able to contract for safety. Accepted to Rehab. Patient optimistic. Patient denies any A/V hallucinations. No evidence of psychosis noted. Patient visible on the unit engaging with peers, watching TV in the TV room and attending groups. Compliant with medication. Does not warrant continued Inpatient hospitalization. Patient does not present a risk to self or others at this time. Patient psychiatrically stable for discharge to Rehab. Anticipated discharge 12/26/24.

## 2024-12-24 NOTE — BH INPATIENT PSYCHIATRY PROGRESS NOTE - NSBHASSESSSUMMFT_PSY_ALL_CORE
Patient is 34 year old male, street homeless, recently unemployed, hx alcohol use, no known PMH, PPH MDD, recent inpatient hospitalization at [facility] 12/7/24-12/13/24 for SI with plan, no known hx SA/SIB/violence, self-presenting to ED reporting recurring suicidal thoughts due to housing stressors (reportedly father refusing to have pt stay with him in [place]), with plan to jump off [facility].  on initial presentation.    Patient seen and evaluated 12/24/24. As per nursing report no acute events. On approach patient calm and cooperative, pleasant. No agitation or aggression noted. Discharge was scheduled for yesterday, then postponed to Thursday 12/26/24 due to transportation issues. Patient continues to express an improved mood. Has responded well to the medication. Patient denies any suicidal/homicidal ideations. Able to contract for safety. Accepted to Rehab. Patient optimistic. Patient denies any A/V hallucinations. No evidence of psychosis noted. Patient visible on the unit engaging with peers, watching TV in the TV room and attending groups. Compliant with medication. Does not warrant continued Inpatient hospitalization. Patient does not present a risk to self or others at this time. Patient psychiatrically stable for discharge to Rehab. Anticipated discharge 12/26/24.     #Admit    #Plan 12/23/24    #MDD  -Zoloft 100mg daily    #Alcohol use  -CATCH Consult  -Thiamine  -Folate    -Hydroxyzine 50mg Q6 PRN for anxiety or insomnia  -Haldol 5mg Q8 PRN for agitation or psychosis  -Benadryl 50mg Q8 PRN for EPS  -Lorazepam 2mg Q8 PRN for aggression    #Agitation  -for agitation not amenable to verbal redirection, may give haldol 5 mg q6h prn, ativan 2 mg q6h prn, benadryl 50 mg q6h prn with escalation to IM if pt is a danger to self or/and others with repeat EKG to ensure QTc <500 ms.

## 2024-12-24 NOTE — BH INPATIENT PSYCHIATRY PROGRESS NOTE - NSBHMETABOLIC_PSY_ALL_CORE_FT
BMI: BMI (kg/m2): 36.2 (12-15-24 @ 18:27)  HbA1c: A1C with Estimated Average Glucose Result: 5.8 % (12-18-24 @ 07:20)    Glucose:   BP: 116/74 (12-23-24 @ 16:35) (111/73 - 147/93)Vital Signs Last 24 Hrs  T(C): 36.5 (12-23-24 @ 16:35), Max: 36.5 (12-23-24 @ 10:54)  T(F): 97.7 (12-23-24 @ 16:35), Max: 97.7 (12-23-24 @ 10:54)  HR: 58 (12-23-24 @ 16:35) (57 - 58)  BP: 116/74 (12-23-24 @ 16:35) (116/74 - 127/92)  BP(mean): --  RR: --  SpO2: --      Lipid Panel: Date/Time: 12-18-24 @ 07:20  Cholesterol, Serum: 181  LDL Cholesterol Calculated: 117  HDL Cholesterol, Serum: 56  Total Cholesterol/HDL Ration Measurement: --  Triglycerides, Serum: 38

## 2024-12-25 RX ADMIN — Medication 1 MILLIGRAM(S): at 08:11

## 2024-12-25 RX ADMIN — SERTRALINE HYDROCHLORIDE 100 MILLIGRAM(S): 25 TABLET ORAL at 08:11

## 2024-12-26 VITALS — DIASTOLIC BLOOD PRESSURE: 80 MMHG | HEART RATE: 68 BPM | TEMPERATURE: 99 F | SYSTOLIC BLOOD PRESSURE: 121 MMHG

## 2024-12-26 PROCEDURE — 99238 HOSP IP/OBS DSCHRG MGMT 30/<: CPT

## 2024-12-26 RX ADMIN — Medication 1 MILLIGRAM(S): at 08:19

## 2024-12-26 RX ADMIN — SERTRALINE HYDROCHLORIDE 100 MILLIGRAM(S): 25 TABLET ORAL at 08:19

## 2024-12-26 NOTE — BH INPATIENT PSYCHIATRY PROGRESS NOTE - NSBHASSESSSUMMFT_PSY_ALL_CORE
Patient is 34 year old male, street homeless, recently unemployed, hx alcohol use, no known PMH, PPH MDD, recent inpatient hospitalization at [facility] 12/7/24-12/13/24 for SI with plan, no known hx SA/SIB/violence, self-presenting to ED reporting recurring suicidal thoughts due to housing stressors (reportedly father refusing to have pt stay with him in [place]), with plan to jump off [facility].  on initial presentation.    Patient seen and evaluated 12/24/24. As per nursing report no acute events. On approach patient calm and cooperative, pleasant. No agitation or aggression noted. Discharge was scheduled for yesterday, then postponed to Thursday 12/26/24 due to transportation issues. Patient continues to express an improved mood. Has responded well to the medication. Patient denies any suicidal/homicidal ideations. Able to contract for safety. Accepted to Rehab. Patient optimistic. Patient denies any A/V hallucinations. No evidence of psychosis noted. Patient visible on the unit engaging with peers, watching TV in the TV room and attending groups. Compliant with medication. Does not warrant continued Inpatient hospitalization. Patient does not present a risk to self or others at this time. Patient psychiatrically stable for discharge to Rehab.     On evaluation, pt presents pleasant and cooperative, linear with good ADLs. He reports he is doing well and ready for discharge today. He endorses fair mood, sleep and appetite. No overt psychosis noted. He denies SI/HI. He presents future-oriented and goal-directed. Discharge today to rehab.    #MDD  -Zoloft 100mg daily    #Alcohol use  -CATCH Consult  -Thiamine  -Folate    -Hydroxyzine 50mg Q6 PRN for anxiety or insomnia  -Haldol 5mg Q8 PRN for agitation or psychosis  -Benadryl 50mg Q8 PRN for EPS  -Lorazepam 2mg Q8 PRN for aggression    #Agitation  -for agitation not amenable to verbal redirection, may give haldol 5 mg q6h prn, ativan 2 mg q6h prn, benadryl 50 mg q6h prn with escalation to IM if pt is a danger to self or/and others with repeat EKG to ensure QTc <500 ms.

## 2024-12-26 NOTE — BH INPATIENT PSYCHIATRY PROGRESS NOTE - NSTXSUBMISGOAL_PSY_ALL_CORE
Will develop a relapse prevention plan

## 2024-12-26 NOTE — BH INPATIENT PSYCHIATRY PROGRESS NOTE - NSDCCRITERIA_PSY_ALL_CORE
To be discharged once deemed not a danger to self or others.

## 2024-12-26 NOTE — BH INPATIENT PSYCHIATRY PROGRESS NOTE - NSBHCHARTREVIEWVS_PSY_A_CORE FT
Vital Signs Last 24 Hrs  T(C): 37.4 (12-26-24 @ 08:52), Max: 37.4 (12-26-24 @ 08:52)  T(F): 99.3 (12-26-24 @ 08:52), Max: 99.3 (12-26-24 @ 08:52)  HR: 68 (12-26-24 @ 08:52) (60 - 68)  BP: 121/80 (12-26-24 @ 08:52) (121/80 - 143/86)  BP(mean): --  RR: --  SpO2: --

## 2024-12-26 NOTE — BH INPATIENT PSYCHIATRY PROGRESS NOTE - NSTXSUICIDPROGRES_PSY_ALL_CORE
Met - goal discontinued
Improving
Met - goal discontinued
Improving

## 2024-12-26 NOTE — BH CHART NOTE - NSDETAILSOTHERINTERV_PSY_ALL_CORE
discharge today
Routine observation while on unit. Patient to be discharged today. Denies suicidal/ homicidal ideations. Patient able to contract for safety. Does not warrant continued Inpatient hospitalization. Patient does not appear to be of risk to self or others at this time.

## 2024-12-26 NOTE — BH INPATIENT PSYCHIATRY PROGRESS NOTE - CURRENT MEDICATION
MEDICATIONS  (STANDING):  folic acid 1 milliGRAM(s) Oral daily  sertraline 100 milliGRAM(s) Oral daily    MEDICATIONS  (PRN):  diphenhydrAMINE 50 milliGRAM(s) Oral every 6 hours PRN EPS  haloperidol     Tablet 5 milliGRAM(s) Oral every 8 hours PRN agitation  hydrOXYzine hydrochloride 50 milliGRAM(s) Oral every 6 hours PRN Anxiety and or insomnia  LORazepam     Tablet 2 milliGRAM(s) Oral every 8 hours PRN Anxiety  
MEDICATIONS  (STANDING):  folic acid 1 milliGRAM(s) Oral daily  sertraline 100 milliGRAM(s) Oral daily    MEDICATIONS  (PRN):  diphenhydrAMINE 50 milliGRAM(s) Oral every 6 hours PRN EPS  haloperidol     Tablet 5 milliGRAM(s) Oral every 8 hours PRN agitation  hydrOXYzine hydrochloride 50 milliGRAM(s) Oral every 6 hours PRN Anxiety and or insomnia  LORazepam     Tablet 2 milliGRAM(s) Oral every 8 hours PRN Anxiety  
MEDICATIONS  (STANDING):  folic acid 1 milliGRAM(s) Oral daily  sertraline 100 milliGRAM(s) Oral daily  thiamine 100 milliGRAM(s) Oral daily    MEDICATIONS  (PRN):  diphenhydrAMINE 50 milliGRAM(s) Oral every 6 hours PRN EPS  haloperidol     Tablet 5 milliGRAM(s) Oral every 8 hours PRN agitation  hydrOXYzine hydrochloride 50 milliGRAM(s) Oral every 6 hours PRN Anxiety and or insomnia  LORazepam     Tablet 2 milliGRAM(s) Oral every 8 hours PRN Anxiety  
MEDICATIONS  (STANDING):  folic acid 1 milliGRAM(s) Oral daily  sertraline 100 milliGRAM(s) Oral daily  thiamine 100 milliGRAM(s) Oral daily    MEDICATIONS  (PRN):  diphenhydrAMINE 50 milliGRAM(s) Oral every 6 hours PRN EPS  haloperidol     Tablet 5 milliGRAM(s) Oral every 8 hours PRN agitation  hydrOXYzine hydrochloride 50 milliGRAM(s) Oral every 6 hours PRN Anxiety and or insomnia  LORazepam     Tablet 2 milliGRAM(s) Oral every 8 hours PRN Anxiety  
MEDICATIONS  (STANDING):  folic acid 1 milliGRAM(s) Oral daily  sertraline 100 milliGRAM(s) Oral daily    MEDICATIONS  (PRN):  diphenhydrAMINE 50 milliGRAM(s) Oral every 6 hours PRN EPS  haloperidol     Tablet 5 milliGRAM(s) Oral every 8 hours PRN agitation  hydrOXYzine hydrochloride 50 milliGRAM(s) Oral every 6 hours PRN Anxiety and or insomnia  LORazepam     Tablet 2 milliGRAM(s) Oral every 8 hours PRN Anxiety  

## 2024-12-26 NOTE — BH INPATIENT PSYCHIATRY PROGRESS NOTE - NSBHCHARTREVIEWINVESTIGATE_PSY_A_CORE FT
< from: 12 Lead ECG (12.15.24 @ 20:29) >    Ventricular Rate 68 BPM    Atrial Rate 68 BPM    P-R Interval 174 ms    QRS Duration 112 ms    Q-T Interval 398 ms    QTC Calculation(Bazett) 423 ms    P Axis 48 degrees    R Axis -39 degrees    T Axis 53 degrees    Diagnosis Line Normal sinus rhythm  Left axis deviation  Incomplete right bundle branch block  Moderate voltage criteria for LVH, may be normal variant ( R in aVL , [place]  product )  Abnormal ECG      
< from: 12 Lead ECG (12.15.24 @ 20:29) >    Ventricular Rate 68 BPM    Atrial Rate 68 BPM    P-R Interval 174 ms    QRS Duration 112 ms    Q-T Interval 398 ms    QTC Calculation(Bazett) 423 ms    P Axis 48 degrees    R Axis -39 degrees    T Axis 53 degrees    Diagnosis Line Normal sinus rhythm  Left axis deviation  Incomplete right bundle branch block  Moderate voltage criteria for LVH, may be normal variant ( R in aVL , [place]  product )  Abnormal ECG      
< from: 12 Lead ECG (12.15.24 @ 20:29) >    Ventricular Rate 68 BPM    Atrial Rate 68 BPM    P-R Interval 174 ms    QRS Duration 112 ms    Q-T Interval 398 ms    QTC Calculation(Bazett) 423 ms    P Axis 48 degrees    R Axis -39 degrees    T Axis 53 degrees    Diagnosis Line Normal sinus rhythm  Left axis deviation  Incomplete right bundle branch block  Moderate voltage criteria for LVH, may be normal variant ( R in aVL , [place]  product )  Abnormal ECG      
< from: 12 Lead ECG (12.15.24 @ 20:29) >    Ventricular Rate 68 BPM    Atrial Rate 68 BPM    P-R Interval 174 ms    QRS Duration 112 ms    Q-T Interval 398 ms    QTC Calculation(Bazett) 423 ms    P Axis 48 degrees    R Axis -39 degrees    T Axis 53 degrees    Diagnosis Line Normal sinus rhythm  Left axis deviation  Incomplete right bundle branch block  Moderate voltage criteria for LVH, may be normal variant ( R in aVL , [place]  product )  Abnormal ECG      
< from: 12 Lead ECG (12.15.24 @ 20:29) >    Ventricular Rate 68 BPM    Atrial Rate 68 BPM    P-R Interval 174 ms    QRS Duration 112 ms    Q-T Interval 398 ms    QTC Calculation(Bazett) 423 ms    P Axis 48 degrees    R Axis -39 degrees    T Axis 53 degrees    Diagnosis Line Normal sinus rhythm  Left axis deviation  Incomplete right bundle branch block  Moderate voltage criteria for LVH, may be normal variant ( R in aVL , [place]  product )  Abnormal ECG      
< from: 12 Lead ECG (12.15.24 @ 20:29) >    Ventricular Rate 68 BPM    Atrial Rate 68 BPM    P-R Interval 174 ms    QRS Duration 112 ms    Q-T Interval 398 ms    QTC Calculation(Bazett) 423 ms    P Axis 48 degrees    R Axis -39 degrees    T Axis 53 degrees    Diagnosis Line Normal sinus rhythm  Left axis deviation  Incomplete right bundle branch block  Moderate voltage criteria for LVH, may be normal variant ( R in aVL , [place]  product )  Abnormal ECG      
< from: 12 Lead ECG (12.15.24 @ 20:29) >    Ventricular Rate 68 BPM    Atrial Rate 68 BPM    P-R Interval 174 ms    QRS Duration 112 ms    Q-T Interval 398 ms    QTC Calculation(Bazett) 423 ms    P Axis 48 degrees    R Axis -39 degrees    T Axis 53 degrees    Diagnosis Line Normal sinus rhythm  Left axis deviation  Incomplete right bundle branch block  Moderate voltage criteria for LVH, may be normal variant ( R in aVL , [place]  product )  Abnormal ECG

## 2024-12-26 NOTE — BH INPATIENT PSYCHIATRY PROGRESS NOTE - NSBHATTESTTYPEVISIT_PSY_A_CORE
On-site Attending supervising YOLETTE (99XXX codes)
Attending with Resident/Fellow/Student

## 2024-12-26 NOTE — BH INPATIENT PSYCHIATRY PROGRESS NOTE - NSCGIIMPROVESX_PSY_ALL_CORE
2 = Much improved - notably better with signficant reduction of symptoms; increase in the level of functioning but some symptoms remain
2 = Much improved - notably better with signficant reduction of symptoms; increase in the level of functioning but some symptoms remain
1 - Very much improved - nearly all better; good level of functioning; minimal symptoms; represents a very substantial change

## 2024-12-26 NOTE — BH INPATIENT PSYCHIATRY PROGRESS NOTE - NSTXSUBMISPROGRES_PSY_ALL_CORE
Met - goal discontinued
Met - goal discontinued
Improving
Met - goal discontinued

## 2024-12-26 NOTE — BH CHART NOTE - RISK ASSESSMENT
Repeat assessment done for time period of hospitalization. Denies suicidal/ homicidal ideations. Patient able to contract for safety. Compliant with medication. Does not warrant continued Inpatient hospitalization. Patient does not appear to be of risk to self or others at this time.
Suicide and risk assessment performed prior to discharge. The patient with low acute risk. Protective factors include help-seeking bx, denying SI/HI, future orientation, goal-directed, no SIB, able to safety plan, verbalize reasons for living, motivation to maintain sobriety. Risk factors include presenting/hx illness. Immediate risk was minimized by inpatient admission to a safe environment with appropriate supervision and limited access to lethal means. Future risk was minimized before discharge by treatment of acute episode, maximizing outpatient support, providing relevant patient education, discussing emergency procedures, and ensuring close follow-up. The patient remains at a low risk of self-harm, and such risk cannot be further ameliorated by continued inpatient treatment and the patient is therefore appropriate for discharge.

## 2024-12-26 NOTE — BH INPATIENT PSYCHIATRY PROGRESS NOTE - NSICDXBHPRIMARYDX_PSY_ALL_CORE
Severe major depression without psychotic features   F32.2  

## 2024-12-26 NOTE — BH INPATIENT PSYCHIATRY PROGRESS NOTE - NSCGISEVERILLNESS_PSY_ALL_CORE
1 = Normal – not at all ill, symptoms of disorder not present past seven days
3 = Mildly ill – clearly established symptoms with minimal, if any, distress or difficulty in social and occupational function
3 = Mildly ill – clearly established symptoms with minimal, if any, distress or difficulty in social and occupational function

## 2024-12-26 NOTE — BH INPATIENT PSYCHIATRY PROGRESS NOTE - NSTXDEPRESPROGRES_PSY_ALL_CORE
Met - goal discontinued
Improving
Improving
Met - goal discontinued
Improving
Improving
Met - goal discontinued

## 2024-12-26 NOTE — BH CHART NOTE - NS ED BHA SUICIDALITY PRESENT CURRENT PASSIVE IDEATION
Chief Complaint   Patient presents with   • Annual Exam         HPI:  Efraín Ayala is a 65 y.o. here for Medicare Annual Wellness Visit     Patient Active Problem List    Diagnosis Date Noted   • Elevated PSA 04/05/2018   • Pure hypercholesterolemia 03/26/2018   • Prediabetes 03/26/2018   • Benign prostatic hyperplasia without lower urinary tract symptoms 03/26/2018   • Chronic pain of left knee 08/22/2017   • QT prolongation 04/16/2015   • Seasonal allergies 03/11/2015   • Anxiety 03/11/2015   • Elevated fasting blood sugar 03/11/2015       Current Outpatient Prescriptions   Medication Sig Dispense Refill   • ALPRAZolam (XANAX) 0.5 MG Tab Take 1 Tab by mouth 2 times a day as needed for Anxiety for up to 90 days. 180 Tab 1   • ibuprofen (MOTRIN) 200 MG Tab Take 400 mg by mouth every 6 hours as needed.       No current facility-administered medications for this visit.             Current supplements as per medication list.       Allergies: Other misc and Shellfish allergy    Current social contact/activities: lives at home with wife. 3 daughters, one lives in [place], one in [facility], one in [place].     He  reports that he has quit smoking. His smoking use included Cigarettes. He has a 7.50 pack-year smoking history. He has never used smokeless tobacco. He reports that he drinks about 8.4 oz of alcohol per week . He reports that he does not use drugs.  Counseling given: Not Answered      DPA/Advanced Directive:  Patient has Advanced Directive, but it is not on file. Instructed to bring in a copy to scan into their chart.    ROS:    Gait: Uses no assistive device  Ostomy: No  Other tubes: No  Amputations: No  Chronic oxygen use: No  Last eye exam: within the last 1 year.  Wears hearing aids: No   : Denies any urinary leakage during the last 6 months      Depression Screening    Little interest or pleasure in doing things?  0 - not at all  Feeling down, depressed , or hopeless? 0 - not at all  Patient Health 
Questionnaire Score: 0     If depressive symptoms identified deferred to follow up visit unless specifically addressed in assessment and plan.    Interpretation of PHQ-9 Total Score   Score Severity   1-4 No Depression   5-9 Mild Depression   10-14 Moderate Depression   15-19 Moderately Severe Depression   20-27 Severe Depression    Screening for Cognitive Impairment    Three Minute Recall (leader, season, table) 2/3    Ashutosh clock face with all 12 numbers and set the hands to show 10 past 11.  Yes    Cognitive concerns identified deferred for follow up unless specifically addressed in assessment and plan.    Fall Risk Assessment    Has the patient had two or more falls in the last year or any fall with injury in the last year?  No    Safety Assessment    Throw rugs on floor.  Yes  Handrails on all stairs.  Yes  Good lighting in all hallways.  Yes  Difficulty hearing.  Yes  Patient counseled about all safety risks that were identified.    Functional Assessment ADLs    Are there any barriers preventing you from cooking for yourself or meeting nutritional needs?  No.    Are there any barriers preventing you from driving safely or obtaining transportation?  No.    Are there any barriers preventing you from using a telephone or calling for help?  No.    Are there any barriers preventing you from shopping?  No.    Are there any barriers preventing you from taking care of your own finances?  No.    Are there any barriers preventing you from managing your medications?  No.    Are there any barriers preventing you from showering, bathing or dressing yourself?  No.    Are you currently engaging in any exercise or physical activity?  Yes.     What is your perception of your health?  Good.      Health Maintenance Summary                IMM ZOSTER VACCINES Overdue 4/13/2016      Done 2/17/2016 Imm Admin: Zoster Vaccine Live (ZVL) (Zostavax)    IMM INFLUENZA Overdue 9/1/2018      Done 9/15/2017 Imm Admin: Influenza Vaccine Quad 
"Inj (Pf)     Patient has more history with this topic...    IMM PNEUMOCOCCAL 65+ (ADULT) LOW/MEDIUM RISK SERIES Overdue 3/26/2019      Done 3/26/2018 Imm Admin: Pneumococcal Conjugate Vaccine (Prevnar/PCV-13)    Annual Wellness Visit Next Due 3/27/2019      Done 3/26/2018 Visit Dx: Welcome to Medicare preventive visit    COLONOSCOPY Next Due 5/14/2020      Done 5/14/2015 AMB REFERRAL TO GI FOR COLONOSCOPY    IMM DTaP/Tdap/Td Vaccine Next Due 3/14/2027      Done 3/14/2017 Imm Admin: Tdap Vaccine          Patient Care Team:  Binu Montemayor M.D. as PCP - General (Family Medicine)        Social History   Substance Use Topics   • Smoking status: Former Smoker     Packs/day: 0.50     Years: 15.00     Types: Cigarettes   • Smokeless tobacco: Never Used      Comment: Quit 1986   • Alcohol use 8.4 oz/week     14 Cans of beer per week      Comment: 2 beers daily      No family history on file.  He  has a past medical history of Anxiety (3/11/2015); Arthritis (2017); Psychiatric problem (2017); Seasonal allergies (3/11/2015); and Snoring.   Past Surgical History:   Procedure Laterality Date   • INGUINAL HERNIA REPAIR Right 4/13/2017    Procedure: INGUINAL HERNIA REPAIR;  Surgeon: Kwadwo Brewer M.D.;  Location: SURGERY [facility];  Service:    • TIBIA ORIF  1992    Left       Exam:   Blood pressure 140/82, pulse 89, temperature 36.7 °C (98.1 °F), height 1.93 m (6' 4\"), weight 80.3 kg (177 lb), SpO2 95 %. Body mass index is 21.55 kg/m².    Constitutional: Alert, no distress.  Skin: Warm, dry, good turgor, no rashes in visible areas.  Eye: Equal, round and reactive, conjunctiva clear, lids normal.  Respiratory: Unlabored respiratory effort, lungs clear to auscultation, no wheezes, no ronchi.  Cardiovascular: Normal S1, S2, no murmur, no edema.  Psych: Alert and oriented x3, normal affect and mood.      Assessment and Plan. The following treatment and monitoring plan is recommended:    1. Medicare annual wellness visit, "
subsequent  Advised healthy lifestyle.    2. Anxiety  Controlled.  Continue alprazolam 0.5 mg twice daily as needed for anxiety.    3. Benign prostatic hyperplasia without lower urinary tract symptoms  Patient is waking up only once at night and urinary flow is adequate.    4. Elevated PSA  Patient is having upcoming MRI because of persistent elevated PSA.  Patient is being followed by urology.    5. Elevated fasting blood sugar  Check labs and call with results.  - Comp Metabolic Panel; Future  - HEMOGLOBIN A1C; Future    6. Prediabetes  Check labs and call with results.    7. Pure hypercholesterolemia  Check labs and call with results.  - Comp Metabolic Panel; Future  - Lipid Profile; Future  - TSH WITH REFLEX TO FT4; Future    8. Chronic pain of left knee  Mild.  Controlled with as needed ibuprofen.    9. QT prolongation  Asymptomatic.  Continue to monitor.    10. Seasonal allergies  Mild.  Continue to monitor.    11. Other neutropenia (HCC)  Check labs and call with results.  - CBC WITH DIFFERENTIAL; Future    12. Need for vaccination  - Pneumococal Polysaccharide Vaccine 23-Valent =>3YO SQ/IM        Services suggested: No services needed at this time  Health Care Screening: Age-appropriate preventive services recommended by USPTF and ACIP covered by Medicare were discussed today. Services ordered if indicated and agreed upon by the patient.  Referrals offered: Community-based lifestyle interventions to reduce health risks and promote self-management and wellness, fall prevention, nutrition, physical activity, tobacco-use cessation, weight loss, and mental health services as per orders if indicated.    Discussion today about general wellness and lifestyle habits:    · Prevent falls and reduce trip hazards; Cautioned about securing or removing rugs.  · Have a working fire alarm and carbon monoxide detector;   · Engage in regular physical activity and social activities     Follow-up: No Follow-up on file.  
No
No

## 2024-12-26 NOTE — BH INPATIENT PSYCHIATRY PROGRESS NOTE - NSBHMETABOLIC_PSY_ALL_CORE_FT
BMI: BMI (kg/m2): 36.2 (12-15-24 @ 18:27)  HbA1c: A1C with Estimated Average Glucose Result: 5.8 % (12-18-24 @ 07:20)    Glucose:   BP: 121/80 (12-26-24 @ 08:52) (95/63 - 143/86)Vital Signs Last 24 Hrs  T(C): 37.4 (12-26-24 @ 08:52), Max: 37.4 (12-26-24 @ 08:52)  T(F): 99.3 (12-26-24 @ 08:52), Max: 99.3 (12-26-24 @ 08:52)  HR: 68 (12-26-24 @ 08:52) (60 - 68)  BP: 121/80 (12-26-24 @ 08:52) (121/80 - 143/86)  BP(mean): --  RR: --  SpO2: --      Lipid Panel: Date/Time: 12-18-24 @ 07:20  Cholesterol, Serum: 181  LDL Cholesterol Calculated: 117  HDL Cholesterol, Serum: 56  Total Cholesterol/HDL Ration Measurement: --  Triglycerides, Serum: 38

## 2024-12-26 NOTE — BH INPATIENT PSYCHIATRY PROGRESS NOTE - NSBHATTESTBILLING_PSY_A_CORE
78381-Ejuesrwfnz OBS or IP - low complexity OR 25-34 mins
19651-Epdnchpofz OBS or IP - low complexity OR 25-34 mins
26373-Wektwkwwit OBS or IP - low complexity OR 25-34 mins
32003-Rjaiqdjkvg OBS or IP - low complexity OR 25-34 mins
52695-Lngyvybrsb OBS or IP - low complexity OR 25-34 mins
77220-Yzmykbgnfr OBS or IP - low complexity OR 25-34 mins
88092-Lpfexsssmj OBS or IP - low complexity OR 25-34 mins

## 2024-12-26 NOTE — BH INPATIENT PSYCHIATRY PROGRESS NOTE - NSICDXBHSECONDARYDX_PSY_ALL_CORE
Alcohol use disorder, moderate, dependence   F10.20  Adjustment disorder   F43.20  

## 2024-12-26 NOTE — BH INPATIENT PSYCHIATRY PROGRESS NOTE - NSBHFUPINTERVALHXFT_PSY_A_CORE
Pt seen and evaluated, chart reviewed. As per nursing report, no acute events overnight, no PRNs. On evaluation, pt presents calm and cooperative, reports he is ready for discharge and denies any new complaints. He reports fair mood, sleep and appetite. He denies AVH, paranoia. He denies SI/HI. He denies negative side effects of medications. He presents future-oriented and goal-directed, endorses motivation to maintain sobriety and complete rehab, reports goal to find a job and housing. Discharge today. Pt verbalizes understanding and agrees to discharge instructions.

## 2024-12-29 DIAGNOSIS — F32.2 MAJOR DEPRESSIVE DISORDER, SINGLE EPISODE, SEVERE WITHOUT PSYCHOTIC FEATURES: ICD-10-CM

## 2024-12-29 DIAGNOSIS — R45.851 SUICIDAL IDEATIONS: ICD-10-CM

## 2024-12-29 DIAGNOSIS — Z59.00 HOMELESSNESS UNSPECIFIED: ICD-10-CM

## 2024-12-29 DIAGNOSIS — F43.20 ADJUSTMENT DISORDER, UNSPECIFIED: ICD-10-CM

## 2024-12-29 SDOH — ECONOMIC STABILITY - HOUSING INSECURITY: HOMELESSNESS UNSPECIFIED: Z59.00

## 2025-04-04 ENCOUNTER — EMERGENCY (EMERGENCY)
Facility: HOSPITAL | Age: 35
LOS: 1 days | Discharge: HOME | End: 2025-04-04
Admitting: STUDENT IN AN ORGANIZED HEALTH CARE EDUCATION/TRAINING PROGRAM
Payer: COMMERCIAL

## 2025-04-04 VITALS
RESPIRATION RATE: 18 BRPM | TEMPERATURE: 98 F | SYSTOLIC BLOOD PRESSURE: 110 MMHG | DIASTOLIC BLOOD PRESSURE: 76 MMHG | OXYGEN SATURATION: 98 % | HEART RATE: 88 BPM | WEIGHT: 270.07 LBS | HEIGHT: 73 IN

## 2025-04-04 LAB
ANION GAP SERPL CALC-SCNC: 13 MMOL/L — SIGNIFICANT CHANGE UP (ref 5–17)
BASOPHILS # BLD AUTO: 0.04 K/UL — SIGNIFICANT CHANGE UP (ref 0–0.2)
BASOPHILS NFR BLD AUTO: 0.7 % — SIGNIFICANT CHANGE UP (ref 0–2)
BUN SERPL-MCNC: 14 MG/DL — SIGNIFICANT CHANGE UP (ref 7–23)
CALCIUM SERPL-MCNC: 9.4 MG/DL — SIGNIFICANT CHANGE UP (ref 8.4–10.5)
CHLORIDE SERPL-SCNC: 104 MMOL/L — SIGNIFICANT CHANGE UP (ref 96–108)
CO2 SERPL-SCNC: 23 MMOL/L — SIGNIFICANT CHANGE UP (ref 22–31)
CREAT SERPL-MCNC: 1.15 MG/DL — SIGNIFICANT CHANGE UP (ref 0.5–1.3)
EGFR: 85 ML/MIN/1.73M2 — SIGNIFICANT CHANGE UP
EGFR: 85 ML/MIN/1.73M2 — SIGNIFICANT CHANGE UP
EOSINOPHIL # BLD AUTO: 0.29 K/UL — SIGNIFICANT CHANGE UP (ref 0–0.5)
EOSINOPHIL NFR BLD AUTO: 5.1 % — SIGNIFICANT CHANGE UP (ref 0–6)
FLUAV AG NPH QL: SIGNIFICANT CHANGE UP
FLUBV AG NPH QL: SIGNIFICANT CHANGE UP
GLUCOSE SERPL-MCNC: 98 MG/DL — SIGNIFICANT CHANGE UP (ref 70–99)
HCT VFR BLD CALC: 37.9 % — LOW (ref 39–50)
HGB BLD-MCNC: 12.9 G/DL — LOW (ref 13–17)
IMM GRANULOCYTES NFR BLD AUTO: 0.2 % — SIGNIFICANT CHANGE UP (ref 0–0.9)
LYMPHOCYTES # BLD AUTO: 1.97 K/UL — SIGNIFICANT CHANGE UP (ref 1–3.3)
LYMPHOCYTES # BLD AUTO: 34.4 % — SIGNIFICANT CHANGE UP (ref 13–44)
MCHC RBC-ENTMCNC: 30 PG — SIGNIFICANT CHANGE UP (ref 27–34)
MCHC RBC-ENTMCNC: 34 G/DL — SIGNIFICANT CHANGE UP (ref 32–36)
MCV RBC AUTO: 88.1 FL — SIGNIFICANT CHANGE UP (ref 80–100)
MONOCYTES # BLD AUTO: 0.52 K/UL — SIGNIFICANT CHANGE UP (ref 0–0.9)
MONOCYTES NFR BLD AUTO: 9.1 % — SIGNIFICANT CHANGE UP (ref 2–14)
NEUTROPHILS # BLD AUTO: 2.9 K/UL — SIGNIFICANT CHANGE UP (ref 1.8–7.4)
NEUTROPHILS NFR BLD AUTO: 50.5 % — SIGNIFICANT CHANGE UP (ref 43–77)
NRBC BLD AUTO-RTO: 0 /100 WBCS — SIGNIFICANT CHANGE UP (ref 0–0)
PLATELET # BLD AUTO: 202 K/UL — SIGNIFICANT CHANGE UP (ref 150–400)
POTASSIUM SERPL-MCNC: 4 MMOL/L — SIGNIFICANT CHANGE UP (ref 3.5–5.3)
POTASSIUM SERPL-SCNC: 4 MMOL/L — SIGNIFICANT CHANGE UP (ref 3.5–5.3)
RBC # BLD: 4.3 M/UL — SIGNIFICANT CHANGE UP (ref 4.2–5.8)
RBC # FLD: 12.4 % — SIGNIFICANT CHANGE UP (ref 10.3–14.5)
RSV RNA NPH QL NAA+NON-PROBE: SIGNIFICANT CHANGE UP
SARS-COV-2 RNA SPEC QL NAA+PROBE: SIGNIFICANT CHANGE UP
SODIUM SERPL-SCNC: 140 MMOL/L — SIGNIFICANT CHANGE UP (ref 135–145)
SOURCE RESPIRATORY: SIGNIFICANT CHANGE UP
WBC # BLD: 5.73 K/UL — SIGNIFICANT CHANGE UP (ref 3.8–10.5)
WBC # FLD AUTO: 5.73 K/UL — SIGNIFICANT CHANGE UP (ref 3.8–10.5)

## 2025-04-04 PROCEDURE — 85025 COMPLETE CBC W/AUTO DIFF WBC: CPT

## 2025-04-04 PROCEDURE — 99284 EMERGENCY DEPT VISIT MOD MDM: CPT

## 2025-04-04 PROCEDURE — 36415 COLL VENOUS BLD VENIPUNCTURE: CPT

## 2025-04-04 PROCEDURE — 99284 EMERGENCY DEPT VISIT MOD MDM: CPT | Mod: 25

## 2025-04-04 PROCEDURE — 80048 BASIC METABOLIC PNL TOTAL CA: CPT

## 2025-04-04 PROCEDURE — 87637 SARSCOV2&INF A&B&RSV AMP PRB: CPT

## 2025-04-04 PROCEDURE — 96374 THER/PROPH/DIAG INJ IV PUSH: CPT

## 2025-04-04 RX ORDER — KETOROLAC TROMETHAMINE 30 MG/ML
15 INJECTION, SOLUTION INTRAMUSCULAR; INTRAVENOUS ONCE
Refills: 0 | Status: DISCONTINUED | OUTPATIENT
Start: 2025-04-04 | End: 2025-04-04

## 2025-04-04 RX ADMIN — Medication 1000 MILLILITER(S): at 09:21

## 2025-04-04 RX ADMIN — KETOROLAC TROMETHAMINE 15 MILLIGRAM(S): 30 INJECTION, SOLUTION INTRAMUSCULAR; INTRAVENOUS at 10:00

## 2025-04-04 RX ADMIN — KETOROLAC TROMETHAMINE 15 MILLIGRAM(S): 30 INJECTION, SOLUTION INTRAMUSCULAR; INTRAVENOUS at 09:21

## 2025-04-04 NOTE — ED PROVIDER NOTE - OBJECTIVE STATEMENT
36 y/o m no pmh presents c/o 2 days of general fatigue, body aches, headahce, mild cough.  Pt stating cough has been dry, feels like he is getting sick although stating he doesn't have typical cold symptoms of sore throat and congestion.  Pt reports mild mid abd discomfort as well.  Denies fever, chills, n/v/d, dysuria, CP, SOB, dizziness, all other ROS negative.

## 2025-04-04 NOTE — ED ADULT NURSE NOTE - NEURO ASSESSMENT
[de-identified] : 59 y o M hx of DM, HLD, b/l knee pain, here for an annual physical exam. \par Denies any complaints- no chest pain, no sob, no respiratory problems. 
- - -

## 2025-04-04 NOTE — ED PROVIDER NOTE - PATIENT PORTAL LINK FT
You can access the FollowMyHealth Patient Portal offered by [facility] by registering at the following website: [email or web address]. By joining [employer]’s FollowMyHealth portal, you will also be able to view your health information using other applications (apps) compatible with our system.

## 2025-04-04 NOTE — ED PROVIDER NOTE - CLINICAL SUMMARY MEDICAL DECISION MAKING FREE TEXT BOX
34 y/o m presents c/o 2 days of general fatigue, body aches, cough, headache.  Pt afebrile in ED, nontoxic appearing on exam, likely viral etiology of sx, viral swab sent.  Will check basic labs and give IV fluid/toradol for headache.  F/u results and reassess.

## 2025-04-04 NOTE — ED PROVIDER NOTE - NSFOLLOWUPINSTRUCTIONS_ED_ALL_ED_FT
Acute Cough    WHAT YOU NEED TO KNOW:    An acute cough can last up to 3 weeks. Common causes of an acute cough include a cold, allergies, or a lung infection.    DISCHARGE INSTRUCTIONS:    Return to the emergency department if:    You have trouble breathing or feel short of breath.    You cough up blood, or you see blood in your mucus.    You faint or feel weak or dizzy.    You have chest pain when you cough or take a deep breath.    You have new wheezing.  Contact your healthcare provider if:    You have a fever.    Your cough lasts longer than 4 weeks.    Your symptoms do not improve with treatment.    You have questions or concerns about your condition or care.  Medicines:    Medicines may be needed to stop the cough, decrease swelling in your airways, or help open your airways. Medicine may also be given to help you cough up mucus. Ask your healthcare provider what over-the-counter medicines you can take. If you have an infection caused by bacteria, you may need antibiotics.    Take your medicine as directed. Contact your healthcare provider if you think your medicine is not helping or if you have side effects. Tell your provider if you are allergic to any medicine. Keep a list of the medicines, vitamins, and herbs you take. Include the amounts, and when and why you take them. Bring the list or the pill bottles to follow-up visits. Carry your medicine list with you in case of an emergency.  Manage your symptoms:    Do not smoke and stay away from others who smoke. Nicotine and other chemicals in cigarettes and cigars can cause lung damage and make your cough worse. Ask your healthcare provider for information if you currently smoke and need help to quit. E-cigarettes or smokeless tobacco still contain nicotine. Talk to your healthcare provider before you use these products.    Drink extra liquids as directed. Liquids will help thin and loosen mucus so you can cough it up. Liquids will also help prevent dehydration. Examples of good liquids to drink include water, fruit juice, and broth. Do not drink liquids that contain caffeine. Caffeine can increase your risk for dehydration. Ask your healthcare provider how much liquid to drink each day.    Rest as directed. Do not do activities that make your cough worse, such as exercise.    Use a humidifier or vaporizer. Use a cool mist humidifier or a vaporizer to increase air moisture in your home. This may make it easier for you to breathe and help decrease your cough.  Humidifier      Eat 2 to 5 mL of honey 2 times each day. Honey can help thin mucus and decrease your cough.    Use cough drops or lozenges. These can help decrease throat irritation and your cough.  Follow up with your healthcare provider as directed: Write down your questions so you remember to ask them during your visits.

## 2025-04-04 NOTE — ED PROVIDER NOTE - NSDCPRINTRESULTS_ED_ALL_ED
Patient requests all Lab, Cardiology, and Radiology Results on their Discharge Instructions Oral Minoxidil Pregnancy And Lactation Text: This medication should only be used when clearly needed if you are pregnant, attempting to become pregnant or breast feeding.

## 2025-04-04 NOTE — ED ADULT NURSE NOTE - CHIEF COMPLAINT QUOTE
" I have a headache, feel tired, and have abdominal pain since 2 days ago." AAOx4. Ambulatory. Denies PMHx. Denies cp, sob, f/c, nvd, syncope, fall/trauma, known sick contact, recent international travel.

## 2025-04-04 NOTE — ED ADULT NURSE NOTE - OBJECTIVE STATEMENT
Pt is a 34yo male presenting to ED c/o headache. Pt reports 2 days of consistent headache, denies taking pain medication at this time. Pt is A&Ox4, breathing even and unlabored, reports dry cough beginning yesterday, denies lightheadedness, dizziness, blurred vision.

## 2025-04-04 NOTE — ED ADULT TRIAGE NOTE - CHIEF COMPLAINT QUOTE
" I have a headache, feel tired, and have abdominal pain since 2 days ago." AAOx4. Ambulatory. Denies PMHx. Denies cp, sob, f/c, nvd, syncope, fall/trauma. " I have a headache, feel tired, and have abdominal pain since 2 days ago." AAOx4. Ambulatory. Denies PMHx. Denies cp, sob, f/c, nvd, syncope, fall/trauma, known sick contact, recent international travel.

## 2025-04-04 NOTE — ED ADULT NURSE NOTE - NSFALLUNIVINTERV_ED_ALL_ED
Bed/Stretcher in lowest position, wheels locked, appropriate side rails in place/Call bell, personal items and telephone in reach/Instruct patient to call for assistance before getting out of bed/chair/stretcher/Non-slip footwear applied when patient is off stretcher/[place] to call system/Physically safe environment - no spills, clutter or unnecessary equipment/Purposeful proactive rounding/Room/bathroom lighting operational, light cord in reach

## 2025-04-07 DIAGNOSIS — R53.83 OTHER FATIGUE: ICD-10-CM

## 2025-04-07 DIAGNOSIS — J06.9 ACUTE UPPER RESPIRATORY INFECTION, UNSPECIFIED: ICD-10-CM

## 2025-04-11 ENCOUNTER — EMERGENCY (EMERGENCY)
Facility: HOSPITAL | Age: 35
LOS: 1 days | End: 2025-04-11
Attending: STUDENT IN AN ORGANIZED HEALTH CARE EDUCATION/TRAINING PROGRAM | Admitting: STUDENT IN AN ORGANIZED HEALTH CARE EDUCATION/TRAINING PROGRAM
Payer: COMMERCIAL

## 2025-04-11 VITALS
HEIGHT: 73 IN | OXYGEN SATURATION: 100 % | RESPIRATION RATE: 16 BRPM | DIASTOLIC BLOOD PRESSURE: 76 MMHG | TEMPERATURE: 98 F | HEART RATE: 72 BPM | WEIGHT: 274.92 LBS | SYSTOLIC BLOOD PRESSURE: 119 MMHG

## 2025-04-11 VITALS
TEMPERATURE: 98 F | HEART RATE: 56 BPM | SYSTOLIC BLOOD PRESSURE: 123 MMHG | DIASTOLIC BLOOD PRESSURE: 81 MMHG | OXYGEN SATURATION: 98 % | RESPIRATION RATE: 16 BRPM

## 2025-04-11 LAB
ALBUMIN SERPL ELPH-MCNC: 4.1 G/DL — SIGNIFICANT CHANGE UP (ref 3.3–5)
ALP SERPL-CCNC: 44 U/L — SIGNIFICANT CHANGE UP (ref 40–120)
ALT FLD-CCNC: 16 U/L — SIGNIFICANT CHANGE UP (ref 10–45)
ANION GAP SERPL CALC-SCNC: 11 MMOL/L — SIGNIFICANT CHANGE UP (ref 5–17)
APPEARANCE UR: CLEAR — SIGNIFICANT CHANGE UP
AST SERPL-CCNC: 16 U/L — SIGNIFICANT CHANGE UP (ref 10–40)
BASOPHILS # BLD AUTO: 0.03 K/UL — SIGNIFICANT CHANGE UP (ref 0–0.2)
BASOPHILS NFR BLD AUTO: 0.4 % — SIGNIFICANT CHANGE UP (ref 0–2)
BILIRUB SERPL-MCNC: 0.3 MG/DL — SIGNIFICANT CHANGE UP (ref 0.2–1.2)
BILIRUB UR-MCNC: NEGATIVE — SIGNIFICANT CHANGE UP
BUN SERPL-MCNC: 15 MG/DL — SIGNIFICANT CHANGE UP (ref 7–23)
CALCIUM SERPL-MCNC: 9.2 MG/DL — SIGNIFICANT CHANGE UP (ref 8.4–10.5)
CHLORIDE SERPL-SCNC: 109 MMOL/L — HIGH (ref 96–108)
CO2 SERPL-SCNC: 26 MMOL/L — SIGNIFICANT CHANGE UP (ref 22–31)
COLOR SPEC: YELLOW — SIGNIFICANT CHANGE UP
CREAT SERPL-MCNC: 1.15 MG/DL — SIGNIFICANT CHANGE UP (ref 0.5–1.3)
DIFF PNL FLD: NEGATIVE — SIGNIFICANT CHANGE UP
EGFR: 85 ML/MIN/1.73M2 — SIGNIFICANT CHANGE UP
EGFR: 85 ML/MIN/1.73M2 — SIGNIFICANT CHANGE UP
EOSINOPHIL # BLD AUTO: 0.3 K/UL — SIGNIFICANT CHANGE UP (ref 0–0.5)
EOSINOPHIL NFR BLD AUTO: 4.4 % — SIGNIFICANT CHANGE UP (ref 0–6)
GLUCOSE SERPL-MCNC: 72 MG/DL — SIGNIFICANT CHANGE UP (ref 70–99)
GLUCOSE UR QL: NEGATIVE MG/DL — SIGNIFICANT CHANGE UP
HCT VFR BLD CALC: 38.9 % — LOW (ref 39–50)
HGB BLD-MCNC: 13.3 G/DL — SIGNIFICANT CHANGE UP (ref 13–17)
IMM GRANULOCYTES NFR BLD AUTO: 0.1 % — SIGNIFICANT CHANGE UP (ref 0–0.9)
KETONES UR-MCNC: ABNORMAL MG/DL
LEUKOCYTE ESTERASE UR-ACNC: NEGATIVE — SIGNIFICANT CHANGE UP
LIDOCAIN IGE QN: 24 U/L — SIGNIFICANT CHANGE UP (ref 7–60)
LYMPHOCYTES # BLD AUTO: 2.18 K/UL — SIGNIFICANT CHANGE UP (ref 1–3.3)
LYMPHOCYTES # BLD AUTO: 31.9 % — SIGNIFICANT CHANGE UP (ref 13–44)
MCHC RBC-ENTMCNC: 30.2 PG — SIGNIFICANT CHANGE UP (ref 27–34)
MCHC RBC-ENTMCNC: 34.2 G/DL — SIGNIFICANT CHANGE UP (ref 32–36)
MCV RBC AUTO: 88.2 FL — SIGNIFICANT CHANGE UP (ref 80–100)
MONOCYTES # BLD AUTO: 0.53 K/UL — SIGNIFICANT CHANGE UP (ref 0–0.9)
MONOCYTES NFR BLD AUTO: 7.7 % — SIGNIFICANT CHANGE UP (ref 2–14)
NEUTROPHILS # BLD AUTO: 3.79 K/UL — SIGNIFICANT CHANGE UP (ref 1.8–7.4)
NEUTROPHILS NFR BLD AUTO: 55.5 % — SIGNIFICANT CHANGE UP (ref 43–77)
NITRITE UR-MCNC: NEGATIVE — SIGNIFICANT CHANGE UP
NRBC BLD AUTO-RTO: 0 /100 WBCS — SIGNIFICANT CHANGE UP (ref 0–0)
PCP SPEC-MCNC: SIGNIFICANT CHANGE UP
PH UR: 5.5 — SIGNIFICANT CHANGE UP (ref 5–8)
PLATELET # BLD AUTO: 230 K/UL — SIGNIFICANT CHANGE UP (ref 150–400)
POTASSIUM SERPL-MCNC: 3.7 MMOL/L — SIGNIFICANT CHANGE UP (ref 3.5–5.3)
POTASSIUM SERPL-SCNC: 3.7 MMOL/L — SIGNIFICANT CHANGE UP (ref 3.5–5.3)
PROT SERPL-MCNC: 6.8 G/DL — SIGNIFICANT CHANGE UP (ref 6–8.3)
PROT UR-MCNC: NEGATIVE MG/DL — SIGNIFICANT CHANGE UP
RBC # BLD: 4.41 M/UL — SIGNIFICANT CHANGE UP (ref 4.2–5.8)
RBC # FLD: 12.5 % — SIGNIFICANT CHANGE UP (ref 10.3–14.5)
SODIUM SERPL-SCNC: 146 MMOL/L — HIGH (ref 135–145)
SP GR SPEC: 1.02 — SIGNIFICANT CHANGE UP (ref 1–1.03)
UROBILINOGEN FLD QL: 1 MG/DL — SIGNIFICANT CHANGE UP (ref 0.2–1)
WBC # BLD: 6.84 K/UL — SIGNIFICANT CHANGE UP (ref 3.8–10.5)
WBC # FLD AUTO: 6.84 K/UL — SIGNIFICANT CHANGE UP (ref 3.8–10.5)

## 2025-04-11 PROCEDURE — 36415 COLL VENOUS BLD VENIPUNCTURE: CPT

## 2025-04-11 PROCEDURE — 74177 CT ABD & PELVIS W/CONTRAST: CPT | Mod: 26

## 2025-04-11 PROCEDURE — 74177 CT ABD & PELVIS W/CONTRAST: CPT | Mod: MC

## 2025-04-11 PROCEDURE — 85025 COMPLETE CBC W/AUTO DIFF WBC: CPT

## 2025-04-11 PROCEDURE — 80053 COMPREHEN METABOLIC PANEL: CPT

## 2025-04-11 PROCEDURE — 81003 URINALYSIS AUTO W/O SCOPE: CPT

## 2025-04-11 PROCEDURE — 99285 EMERGENCY DEPT VISIT HI MDM: CPT

## 2025-04-11 PROCEDURE — 83690 ASSAY OF LIPASE: CPT

## 2025-04-11 PROCEDURE — 99284 EMERGENCY DEPT VISIT MOD MDM: CPT | Mod: 25

## 2025-04-11 PROCEDURE — 80307 DRUG TEST PRSMV CHEM ANLYZR: CPT

## 2025-04-11 RX ORDER — POLYETHYLENE GLYCOL 3350 17 G/17G
17 POWDER, FOR SOLUTION ORAL
Qty: 1 | Refills: 0
Start: 2025-04-11 | End: 2025-04-13

## 2025-04-11 RX ORDER — SENNA 187 MG
1 TABLET ORAL
Qty: 5 | Refills: 0
Start: 2025-04-11 | End: 2025-04-15

## 2025-04-11 RX ORDER — SENNA 187 MG
1 TABLET ORAL
Qty: 1 | Refills: 0
Start: 2025-04-11 | End: 2025-04-14

## 2025-04-11 RX ORDER — DOCUSATE SODIUM 100 MG
1 CAPSULE ORAL
Qty: 5 | Refills: 0
Start: 2025-04-11 | End: 2025-04-15

## 2025-04-11 RX ORDER — ONDANSETRON HCL/PF 4 MG/2 ML
4 VIAL (ML) INJECTION ONCE
Refills: 0 | Status: COMPLETED | OUTPATIENT
Start: 2025-04-11 | End: 2025-04-11

## 2025-04-11 RX ADMIN — Medication 4 MILLIGRAM(S): at 13:30

## 2025-04-11 RX ADMIN — Medication 1000 MILLILITER(S): at 13:30

## 2025-04-11 NOTE — ED ADULT TRIAGE NOTE - CHIEF COMPLAINT QUOTE
Pt arrived to ED c/o RLQ abdominal pain since last night. Pt endorses nausea. Pt reports hes been having night sweats for x months but doesn't know why. Pt A&Ox4, ambulatory with steady gait, speaking in clear/full sentences, NAD,

## 2025-04-11 NOTE — ED PROVIDER NOTE - CPE EDP RESP NORM
normal... [Earache] : no earache [Hearing Loss] : no hearing loss [Nosebleeds] : no nosebleeds [Postnasal Drip] : postnasal drip [Sore Throat] : no sore throat [Nasal Discharge] : no nasal discharge [Hoarseness] : no hoarseness [Dysuria] : no dysuria [Incontinence] : no incontinence [Nocturia] : nocturia [Hesitancy] : hesitancy [Hematuria] : no hematuria [Frequency] : frequency [Impotence] : no impotency [Suicidal] : not suicidal [Poor Libido] : libido not poor [Insomnia] : no insomnia [Anxiety] : anxiety [Depression] : depression [Negative] : Heme/Lymph [FreeTextEntry8] : urinating 4x at night

## 2025-04-11 NOTE — ED ADULT NURSE NOTE - OBJECTIVE STATEMENT
Pt reports generalized abd pain and cramping starting at 1700 last night with nausea, denies v/d, denies fevers, reports night sweats x months.

## 2025-04-11 NOTE — ED ADULT NURSE NOTE - NSFALLUNIVINTERV_ED_ALL_ED
Bed/Stretcher in lowest position, wheels locked, appropriate side rails in place/Call bell, personal items and telephone in reach/Instruct patient to call for assistance before getting out of bed/chair/stretcher/Non-slip footwear applied when patient is off stretcher/[employer] to call system/Physically safe environment - no spills, clutter or unnecessary equipment/Purposeful proactive rounding/Room/bathroom lighting operational, light cord in reach

## 2025-04-11 NOTE — ED PROVIDER NOTE - OBJECTIVE STATEMENT
Patient is a 35-year-old male, presents to ED complaining of abdominal pain x 2 days.  Patient states she also has night sweats but denies fever, nausea, vomiting, diarrhea.  Patient denies any shortness of breath or chest pain.

## 2025-04-11 NOTE — ED PROVIDER NOTE - NSPTACCESSSVCSAPPTDETAILS_ED_ALL_ED_FT
Take medication as prescribed.  Follow-up with your primary care provider as needed for further evaluation and management.

## 2025-04-11 NOTE — ED PROVIDER NOTE - NSFOLLOWUPINSTRUCTIONS_ED_ALL_ED_FT
1) My office will call you to schedule the BILATERAL L5 TFESI under local anesthesia once the procedure is approved by your insurance carrier.   We will use less dose of steroids  2) Continue with home exercise program  3) Follow up with me in about 2 months
Constipation    WHAT YOU NEED TO KNOW:    What is constipation? Constipation means you have hard, dry bowel movements, or you go longer than usual between bowel movements.    What causes constipation?    Not enough water or high-fiber foods    Lack of physical activity    Certain medicines, such as opioid pain medicine, antidepressants, or high blood pressure medicine    A medical condition such as hemorrhoids, diabetes, or a stroke  What are the signs and symptoms of constipation?    Trouble pushing out your bowel movement    Pain or bleeding during your bowel movement    A feeling that you did not finish having your bowel movement    Bloating  How is constipation diagnosed? Your healthcare provider will ask about your symptoms and examine you. Tell the provider how often you have a bowel movement and if it is painful. Your provider may ask about your eating habits and if you exercise. Tell the provider about any medicines you take, including fiber supplements. You may need an x-ray of your abdomen. This will help your provider see if you have constipation.    How is constipation treated? Medicine can help you have a bowel movement more easily. Medicines may increase moisture in your bowel movement or increase the motion of your intestines.    A suppository may be used to help soften your bowel movements. This may make them easier to pass. A suppository is guided into your rectum through your anus.  Suppository for Constipation      Laxatives can help stimulate your bowels to have a bowel movement. Your provider may recommend you only use laxatives for a short time. Long-term use can damage your bowel function over time.    An enema is liquid medicine used to clear bowel movement from your rectum. The medicine is put into your rectum through your anus.  Enemas  What can I do to prevent constipation?    Drink liquids as directed. You may need to drink extra liquids to help soften and move your bowels. Ask how much liquid to drink each day and which liquids are best for you.    Eat high-fiber foods. This may help decrease constipation by adding bulk to your bowel movements. High-fiber foods include fruit, vegetables, whole-grain breads and cereals, and beans. Your healthcare provider or dietitian can help you create a high-fiber meal plan. Your provider may also recommend a fiber supplement if you cannot get enough fiber from food.        Exercise regularly. Regular physical activity can help stimulate your intestines. Walking is a good exercise to prevent or relieve constipation. Ask which exercises are best for you.   FAMILY WALKING FOR EXERCISE      Schedule a time each day to have a bowel movement. This may help train your body to have regular bowel movements. Bend forward while you are on the toilet to help move the bowel movement out. Sit on the toilet for at least 10 minutes, even if you do not have a bowel movement.    Talk to your healthcare provider about your medicines. Certain medicines, such as opioids, can cause constipation. Your provider may be able to make medicine changes. For example, he or she may change the kind of medicine, or change when you take it.  When should I call my doctor?    You have blood in your bowel movements.    You have a fever and abdominal pain with the constipation.    Your constipation gets worse.    You start to vomit.    You have questions or concerns about your condition or care.  CARE AGREEMENT:    You have the right to help plan your care. Learn about your health condition and how it may be treated. Discuss treatment options with your healthcare providers to decide what care you want to receive. You always have the right to refuse treatment.

## 2025-04-11 NOTE — ED PROVIDER NOTE - PATIENT PORTAL LINK FT
You can access the FollowMyHealth Patient Portal offered by [facility] by registering at the following website: [email or web address]. By joining [employer]’s FollowMyHealth portal, you will also be able to view your health information using other applications (apps) compatible with our system.

## 2025-04-11 NOTE — ED PROVIDER NOTE - CLINICAL SUMMARY MEDICAL DECISION MAKING FREE TEXT BOX
Pt is a 35-year-old male, who presented to ED complaining of abdominal pain x 2 days.pt deneis fever, n/v/d. pt was seen and examined and had a CT abd/pelvis showing excess stool. pt will be diagnosed with constipation and prescribed Senna, Colace and Miralax w/ f/u with his [facility] care doctor for further evaluation and management

## 2025-04-14 DIAGNOSIS — K59.00 CONSTIPATION, UNSPECIFIED: ICD-10-CM

## 2025-04-14 DIAGNOSIS — R10.11 RIGHT UPPER QUADRANT PAIN: ICD-10-CM

## 2025-04-14 DIAGNOSIS — R10.9 UNSPECIFIED ABDOMINAL PAIN: ICD-10-CM

## 2025-04-14 DIAGNOSIS — R61 GENERALIZED HYPERHIDROSIS: ICD-10-CM

## 2025-04-14 DIAGNOSIS — R10.31 RIGHT LOWER QUADRANT PAIN: ICD-10-CM

## 2025-04-29 ENCOUNTER — EMERGENCY (EMERGENCY)
Facility: HOSPITAL | Age: 35
LOS: 1 days | End: 2025-04-29
Admitting: STUDENT IN AN ORGANIZED HEALTH CARE EDUCATION/TRAINING PROGRAM
Payer: COMMERCIAL

## 2025-04-29 VITALS
HEIGHT: 73 IN | DIASTOLIC BLOOD PRESSURE: 74 MMHG | OXYGEN SATURATION: 100 % | RESPIRATION RATE: 18 BRPM | HEART RATE: 68 BPM | SYSTOLIC BLOOD PRESSURE: 109 MMHG | TEMPERATURE: 98 F | WEIGHT: 279.99 LBS

## 2025-04-29 DIAGNOSIS — J06.9 ACUTE UPPER RESPIRATORY INFECTION, UNSPECIFIED: ICD-10-CM

## 2025-04-29 DIAGNOSIS — R05.1 ACUTE COUGH: ICD-10-CM

## 2025-04-29 LAB
FLUAV AG NPH QL: SIGNIFICANT CHANGE UP
FLUBV AG NPH QL: SIGNIFICANT CHANGE UP
RSV RNA NPH QL NAA+NON-PROBE: SIGNIFICANT CHANGE UP
SARS-COV-2 RNA SPEC QL NAA+PROBE: SIGNIFICANT CHANGE UP
SOURCE RESPIRATORY: SIGNIFICANT CHANGE UP

## 2025-04-29 PROCEDURE — 99284 EMERGENCY DEPT VISIT MOD MDM: CPT

## 2025-04-29 PROCEDURE — 87637 SARSCOV2&INF A&B&RSV AMP PRB: CPT

## 2025-04-29 PROCEDURE — 99283 EMERGENCY DEPT VISIT LOW MDM: CPT

## 2025-04-29 RX ORDER — IBUPROFEN 200 MG
600 TABLET ORAL ONCE
Refills: 0 | Status: COMPLETED | OUTPATIENT
Start: 2025-04-29 | End: 2025-04-29

## 2025-04-29 RX ORDER — BENZONATATE 100 MG
1 CAPSULE ORAL
Qty: 21 | Refills: 0
Start: 2025-04-29 | End: 2025-05-05

## 2025-04-29 RX ADMIN — Medication 600 MILLIGRAM(S): at 12:51

## 2025-04-29 NOTE — ED PROVIDER NOTE - PATIENT PORTAL LINK FT
You can access the FollowMyHealth Patient Portal offered by [facility] by registering at the following website: [email or web address]. By joining [employer]’s FollowMyHealth portal, you will also be able to view your health information using other applications (apps) compatible with our system.

## 2025-04-29 NOTE — ED ADULT NURSE NOTE - NSFALLUNIVINTERV_ED_ALL_ED
Bed/Stretcher in lowest position, wheels locked, appropriate side rails in place/Call bell, personal items and telephone in reach/Instruct patient to call for assistance before getting out of bed/chair/stretcher/Non-slip footwear applied when patient is off stretcher/[place] to call system/Physically safe environment - no spills, clutter or unnecessary equipment/Purposeful proactive rounding/Room/bathroom lighting operational, light cord in reach

## 2025-04-29 NOTE — ED ADULT TRIAGE NOTE - CHIEF COMPLAINT QUOTE
35yoM came to ED C/O itchy throat, cough, and runny nose x4 days. Pt denies fevers, chest pain , SOB. Denies taking medication prior to coming. Talking in full sentences. NAD.

## 2025-04-29 NOTE — ED ADULT NURSE NOTE - NSFALLRISKASMTTYPE_ED_ALL_ED
Progress Note    Tee Dee 54 y o  female MRN: [id number]  Unit/Bed#: 7T [facility] 708-01 Encounter: [id number]  Admitting Physician: Awilda Andrea  PCP: Skylar Petersen PA-C  Date of Admission:  10/04/22    Assessment and Plan    Cholelithiasis  Assessment & Plan  9/29- Gallstones without pericholecystic inflammatory changes  C/o epigastric and RUQ pain  Denies flank pain or dysuria    - RUQ Abdominal US      Hypomagnesemia  Assessment & Plan  Mg=2 2  - At goal      - AM CMP    Trichomonas vaginalis (TV) infection  Assessment & Plan  Urinalysis 9/30/22: Trichomonas Vaginalis noted  Last sexual encounter was a month and a half ago and has one sexual partner  Reports yellow discharge, which occurs a few days prior to menstruating  Patient currently menstruation with 1st day on 10/1/2022    - Flagyl 500 mg BID for 7 days (day 2/7)   - Discussed with patient the importance of partner treatment as well  - Urine GC/Chlamydia ordered and follow up  UTI (urinary tract infection)  Assessment & Plan  UA 09/30/22: positive for opiates,bacteria, WBCs, negative nitirite  Urine culture 9/30/22:  >100 000 Gram negative kenroy   Denies dysuria, urgency or frequency  - S/p Rocephin 1000 mg IV once  VINI (acute kidney injury) ([facility] [address] )  Assessment & Plan  Creatinine since admission: 0 76 (9/28) > 0 87 (9/29/) >1 14 (9/30) >1 77 (10/1)>1 59 (10/2)  10/3: 1 19  Baseline 0 6-0 7      - IV maintenance fluids 125 ml/h    - Daily weights and I/Os  - As per GI, low sodium diet  - AM CMP       Other specified anemias  Assessment & Plan  Hgb since admission: 8 0 > 7 9 > 6 7 > 9 0 (2 hrs post-transfusion 9/30/2022) > 8 7>8 5  Hgb today 10/3/2022- 8 0  Patient is hemodynamically stable and asymptomatic  No previous history of blood transfusion  No hematemesis, melena or hematochezia    Patient currently menstruating; cycle started on 10/1/2022  Hx: Menometrorrhagia, DVT, and PE  Per GI- Likely an anastomic ulcer given her gastric bypass history (2018) and tobacco use  - Per GI- If evidence of GI bleed, start IV PPI and upper endoscopy  - Stop steroids if bleeding or worsening infection  - Transvaginal US to evaluate possible uterine masses as cause of bleeding in the setting of menometrorrhagia  - AM CBC    Financial difficulties  Assessment & Plan  Endorses she stopped taking all her medications because she was unable to afford them  -CM referral in place  Tobacco abuse  Assessment & Plan  Smokes 1/2 a PPD  Not decided on quitting  Pregnancy test negative (9/28/22)  - Nicotine replacement patch order placed  - Smoking cessation counseling  Other constipation  Assessment & Plan  Patient had one soft stool yesterday  Scleral icterus  Appreciate GI recommendations  - Lactulose 20 g PO BID  Alcohol abuse  Assessment & Plan  No nausea, vomiting or constipation  C/o epigastric pain, improved  Hx: alcohol abuse x6 years; 4 cups of vodka daily  AST: 191 > 138 > 122 > 115>118>107  ALT: 75 > 57 > 52 > 58 > 57 >57  ALP: 193 > 149 > 137 > 155 > 135> 136  CT Abd/Pelvis (9/28/22): hepatomegaly; severe steatosis, gallstones with no pericholecystic inflammation  Appreciate GI recommendations  - Ativan 1 mg PO Q6hr PRN for withdrawal symptoms   - Folic acid 1 mg/Thaimine 100 mg/MVit PO daily   - Pantoprazole 40 mg BID  - Transfer to acute rehab when medically stable  - AM CMP and PT-INR    - Alcohol cessation counseling           History of DVT of lower extremity  Assessment & Plan  Hx: DVT (2003,2007,2017), PE, worked with [facility] [id number]  PE: Tenderness in both calves on palpation; left>right LE; Left calve circ>right  Hgb since admission: 8 0 > 7 9 > 6 7 > 9 0 (2 hrs post-transfusion 9/30/2022) > 8 7>8 5  Hgb today 10/3/2022- 8 0      - Vas lower limb venous duplex   - Antithrombin III activity, lupus anticoagulant, cardiolipin Antibody, Ristocetin VWF profile, factor 8 activity, IgM, IgA, IgG, and direct antiglobulin  Hypokalemia  Assessment & Plan  10/3/2022- K=3 7   Goal=4    - Replete potassium 40 meq po x once  - AM CMP        * Severe Alcoholic hepatitis  Assessment & Plan  No nausea, vomiting, and constipation  Hx: PE, DVT, gastric bypass, smoker, alcohol abuse  Labs: Transminitis, anemia, hyperbilirubinemia  CT Abd/Pelvis (22): hepatomegaly; severe steatosis, gallstones with no pericholecystic inflammation  X-ray chest PA (22): No acute cardiopulmonary disease  GI on board; feedback appreciated  Steroids initiated on 22     - Continue Lactulose 20 g BID    - Continue Prednisolone 40 mg Daily as per GI (Day ) with no plan for taper  - If bleeding, discontinue Prednisolone, start PPI IV and get upper endoscopy  - Continue Pantoprazole 40 mg BID   - AM CMP and PT-INR    - Discharge to acute rehab when medically stable  Hyponatremia-resolved as of 10/2/2022  Assessment & Plan  Present on admission  Bilateral +3 edema in LE   22 Echo: Early grade 1 diastolic dysfunction  EF is estimated as around 64%  Trace tricuspid valve regurgitation  No obvious pulmonary hypertension  No pericardial effusion     - Continue to monitor  VTE Pharmacologic Prophylaxis:   Pharmacologic: {VTE Prophylaxis Meds:59121}  Mechanical VTE Prophylaxis in Place: {Yes or No:61151}    Patient Centered Rounds: {Patient Centered Rounds:68292}    Discussions with Specialists or Other Care Team Provider: ***    Education and Discussions with Family / Patient: ***    Time Spent for Care: {Time; Time 15 min - 1 hour:59252}  More than 50% of total time spent on counseling and coordination of care as described above      Current Length of Stay: 6 day(s)    Current Patient Status: Inpatient   Certification Statement: {Certification UAYNBMHHY:78716}    Discharge Plan: ***    Code Status: Level 1 - Full Code      Subjective:   ***    Objective:     Vitals:   Temp (24hrs), Av 3 °F (36 3 °C), Min:96 7 °F (35 9 °C), Max:97 9 °F (36 6 °C)    Temp:  [96 7 °F (35 9 °C)-97 9 °F (36 6 °C)] 96 7 °F (35 9 °C)  HR:  [80-85] 85  Resp:  [18-20] 20  BP: (104-143)/(74-98) 104/74  SpO2:  [96 %-100 %] 97 %  Body mass index is 43 16 kg/m²  Input and Output Summary (last 24 hours): Intake/Output Summary (Last 24 hours) at 10/4/2022 0908  Last data filed at 10/3/2022 1403  Gross per 24 hour   Intake 1037 5 ml   Output --   Net 1037 5 ml       Physical Exam:     Physical Exam  ( *** Be Sure to Include Physical Exam: Delete this entire line when you have entered your exam)    Additional Data:     Labs:    Results from last 7 days   Lab Units 10/04/22  0510   WBC Thousand/uL 8 55   HEMOGLOBIN g/dL 8 3*   HEMATOCRIT % 25 9*   PLATELETS Thousands/uL 227   NEUTROS PCT % 65   LYMPHS PCT % 21   MONOS PCT % 13*   EOS PCT % 0     Results from last 7 days   Lab Units 10/03/22  0525   POTASSIUM mmol/L 3 7   CHLORIDE mmol/L 107   CO2 mmol/L 26   BUN mg/dL 13   CREATININE mg/dL 1 19   CALCIUM mg/dL 7 8*   ALK PHOS U/L 136*   ALT U/L 57*   AST U/L 107*     Results from last 7 days   Lab Units 10/03/22  0525   INR  1 68*     Results from last 7 days   Lab Units 10/01/22  1118 09/30/22  1347 09/29/22  1112   POC GLUCOSE mg/dl 90 130 65             * I Have Reviewed All Lab Data Listed Above    * Additional Pertinent Lab Tests Reviewed: {Labreview:52341}    Imaging:    Imaging Reports Reviewed Today Include: ***  Imaging Personally Reviewed by Myself Includes:  ***    Recent Cultures (last 7 days):     Results from last 7 days   Lab Units 09/30/22  1113   URINE CULTURE  >100,000 cfu/ml Escherichia coli*  10,000-19,000 cfu/ml        Last 24 Hours Medication List:   Current Facility-Administered Medications   Medication Dose Route Frequency Provider Last Rate    acetaminophen  650 mg Oral Q8H PRN Michel Zacarias MD      folic acid  1 mg Oral Daily Patti Garner MD      LORazepam  2 mg Intravenous Q6H PRN Michell Brambila MD      metroNIDAZOLE  500 mg Oral Q12H [facility] Neeta Cortez MD      multivitamin-minerals  1 tablet Oral Daily Patti Pantoja MD      nicotine  14 mg Transdermal Daily Neeta Cortez MD      ondansetron  4 mg Oral Q6H PRN MD Andres Canela oxyCODONE  5 mg Oral Once Michell Brambila MD      pantoprazole  40 mg Oral BID AC Malvin Guzman IV, MD      polyethylene glycol  17 g Oral Daily PRN Ana Moore PA-C      prednisoLONE  40 mg Oral Daily Ana Moore PA-C      sodium chloride  125 mL/hr Intravenous Continuous Josi Ocampo  mL/hr (10/04/22 0515)    thiamine  100 mg Oral Daily General Michael, MD      traZODone  150 mg Oral HS Michell Brambila MD          Today, Patient Was Seen By: Fermin Dia    ** Please Note: Dictation voice to text software may have been used in the creation of this document   **    Fermin Dia  10/04/22  9:08 AM Initial (On Arrival)

## 2025-04-29 NOTE — ED ADULT NURSE NOTE - OBJECTIVE STATEMENT
34 y/o male presents to the ED c/o flu-like symptoms: cough, congestion, runny nose x1 week. Denies CP, SOB, HA, F/C, N/V/D, weakness, dizziness, and any other complaints at this time. On exam A&Ox4, RA, ambulatory, NAD. Speaking in clear coherent sentences, respirations are spontaneous and unlabored. No obvious signs of injury, DEVINE.

## 2025-04-29 NOTE — ED PROVIDER NOTE - OBJECTIVE STATEMENT
34 y/o m presents c/o cough, sore throat x 3 days.  Cough has been non productive, not taking anything for his sx.  Denies fever, chills, CP, SOB, all other ROS negative.

## 2025-04-29 NOTE — ED ADULT TRIAGE NOTE - HEIGHT IN FEET
Patient presents to ED with complaint of abdominal pain, reports it feels just like previous SBO she had in past, PIV started, labs obtained, patient placed on monitor    6

## 2025-05-02 ENCOUNTER — EMERGENCY (EMERGENCY)
Facility: HOSPITAL | Age: 35
LOS: 1 days | End: 2025-05-02
Attending: STUDENT IN AN ORGANIZED HEALTH CARE EDUCATION/TRAINING PROGRAM | Admitting: STUDENT IN AN ORGANIZED HEALTH CARE EDUCATION/TRAINING PROGRAM
Payer: COMMERCIAL

## 2025-05-02 VITALS
DIASTOLIC BLOOD PRESSURE: 76 MMHG | TEMPERATURE: 101 F | WEIGHT: 279.99 LBS | HEART RATE: 91 BPM | SYSTOLIC BLOOD PRESSURE: 114 MMHG | HEIGHT: 73 IN | RESPIRATION RATE: 18 BRPM | OXYGEN SATURATION: 97 %

## 2025-05-02 LAB — S PYO AG SPEC QL IA: NEGATIVE — SIGNIFICANT CHANGE UP

## 2025-05-02 PROCEDURE — 99284 EMERGENCY DEPT VISIT MOD MDM: CPT

## 2025-05-02 PROCEDURE — 87880 STREP A ASSAY W/OPTIC: CPT

## 2025-05-02 PROCEDURE — 71046 X-RAY EXAM CHEST 2 VIEWS: CPT

## 2025-05-02 PROCEDURE — 99284 EMERGENCY DEPT VISIT MOD MDM: CPT | Mod: 25

## 2025-05-02 PROCEDURE — 96374 THER/PROPH/DIAG INJ IV PUSH: CPT

## 2025-05-02 PROCEDURE — 87081 CULTURE SCREEN ONLY: CPT

## 2025-05-02 PROCEDURE — 71046 X-RAY EXAM CHEST 2 VIEWS: CPT | Mod: 26

## 2025-05-02 RX ORDER — KETOROLAC TROMETHAMINE 30 MG/ML
30 INJECTION, SOLUTION INTRAMUSCULAR; INTRAVENOUS ONCE
Refills: 0 | Status: DISCONTINUED | OUTPATIENT
Start: 2025-05-02 | End: 2025-05-02

## 2025-05-02 RX ORDER — DIPHENHYDRAMINE HCL 12.5MG/5ML
1 ELIXIR ORAL
Qty: 15 | Refills: 0
Start: 2025-05-02 | End: 2025-05-06

## 2025-05-02 RX ADMIN — Medication 1000 MILLILITER(S): at 14:30

## 2025-05-02 RX ADMIN — KETOROLAC TROMETHAMINE 30 MILLIGRAM(S): 30 INJECTION, SOLUTION INTRAMUSCULAR; INTRAVENOUS at 14:31

## 2025-05-02 NOTE — ED PROVIDER NOTE - OBJECTIVE STATEMENT
36 y/o m presents c/o viral symptoms  x 6 days. pt was seen in the ED for similar complain 3 days ago, had negative covid/flu/rsv. however, he reports persistent nonproductive cough, nasal congestion, now with myalgia and chill. No CP, SOB. requesting medication to relieve his pain.

## 2025-05-02 NOTE — ED PROVIDER NOTE - NSFOLLOWUPINSTRUCTIONS_ED_ALL_ED_FT
Viruses are tiny germs that can get into a person's body and cause illness. There are many different types of viruses. And they cause many types of illness. Viral illnesses can range from mild to severe. They can affect various parts of the body.    Short-term conditions that are caused by a virus include colds and flu (influenza) and stomach viruses. Long-term conditions that are caused by a virus include herpes, shingles, and human immunodeficiency virus (HIV) infection. A few viruses have been linked to certain cancers.    What are the causes?  Many types of viruses can cause illness. Viruses get into cells in your body, multiply, and cause the infected cells to work differently or die. When these cells die, they release more of the virus. When this happens, you get symptoms of the illness and the virus spreads to other cells. If the virus takes over how the cell works, it can cause the cell to divide and grow out of control. This happens when a virus causes cancer.    Different viruses get into the body in different ways. You can get a virus by:  Swallowing food or water that has come in contact with the virus.  Breathing in droplets that have been coughed or sneezed into the air by an infected person.  Touching a surface that has the virus on it and then touching your eyes, nose, or mouth.  Being bitten by an insect or animal that carries the virus.  Having sexual contact with a person who is infected with the virus.  Being exposed to blood or fluids that contain the virus, either through an open cut or during a transfusion.  If a virus enters your body, your body's disease-fighting system (immune system) will try to fight the virus. You may be at higher risk for a viral illness if your immune system is weak.    What are the signs or symptoms?  Symptoms depend on the type of virus and the location of the cells that it gets into. Symptoms can include:  For cold and flu viruses:  Fever.  Headache.  Sore throat.  Muscle aches.  Stuffy nose (nasal congestion).  Cough.  For stomach (gastrointestinal) viruses:  Fever.  Pain in the abdomen.  Nausea or vomiting.  Diarrhea.  For liver viruses (hepatitis):  Loss of appetite.  Feeling tired.  Skin or the white parts of your eyes turning yellow (jaundice).  For brain and spinal cord viruses:  Fever.  Headache.  Stiff neck.  Nausea and vomiting.  Confusion or being sleepy.  For skin viruses:  Warts.  Itching.  Rash.  For sexually transmitted viruses:  Discharge.  Swelling.  Redness.  Rash.  How is this diagnosed?  This condition may be diagnosed based on one or more of these:  Your symptoms and medical history.  A physical exam.  Tests, such as:  Blood tests.  Tests on a sample of mucus from your lungs (sputum sample).  Tests on a poop (stool) sample.  Tests on a swab of body fluids or a skin sore (lesion).  How is this treated?  Viruses can be hard to treat because they live within cells. Antibiotics do not treat viruses because these medicines do not get inside cells. Treatment for a viral illness may include:  Resting and drinking a lot of fluids.  Medicines to treat symptoms. These can include over-the-counter medicine for pain and fever, medicines for cough or congestion, and medicines for diarrhea.  Antiviral medicines. These medicines are available only for certain types of viruses.  Some viral illnesses can be prevented with vaccinations. A common example is the flu shot.    Follow these instructions at home:  Medicines    Take over-the-counter and prescription medicines only as told by your health care provider.  If you were prescribed an antiviral medicine, take it as told by your provider. Do not stop taking the antiviral even if you start to feel better.  Know when antibiotics are needed and when they are not needed. Antibiotics do not treat viruses. You may get an antibiotic if your provider thinks that you may have, or are at risk for, a bacterial infection and you have a viral infection.  Do not ask for an antibiotic prescription if you have been diagnosed with a viral illness. Antibiotics will not make your illness go away faster.  Taking antibiotics when they are not needed can lead to antibiotic resistance. When this develops, the medicine no longer works against the bacteria that it normally fights.  General instructions    Drink enough fluids to keep your pee (urine) pale yellow.  Rest as much as possible.  Return to your normal activities as told by your provider. Ask your provider what activities are safe for you.  How is this prevented?  A person washing hands with soap and water.  To lower your risk of getting another viral illness:  Wash your hands often with soap and water for at least 20 seconds. If soap and water are not available, use hand .  Avoid touching your nose, eyes, and mouth, especially if you have not washed your hands recently.  If anyone in your household has a viral infection, clean all household surfaces that may have been in contact with the virus. Use soap and hot water. You may also use a commercially prepared, bleach-containing solution.  Stay away from people who are sick with symptoms of a viral infection.  Do not share items such as toothbrushes and water bottles with other people.  Keep your vaccinations up to date. This includes getting a yearly flu shot.  Eat a healthy diet and get plenty of rest.  Contact a health care provider if:  You have symptoms of a viral illness that do not go away.  Your symptoms come back after going away.  Your symptoms get worse.  Get help right away if:  You have trouble breathing.  You have a severe headache or a stiff neck.  You have severe vomiting or pain in your abdomen.  These symptoms may be an emergency. Get help right away. Call 911.  Do not wait to see if the symptoms will go away.  Do not drive yourself to the hospital.

## 2025-05-02 NOTE — ED ADULT NURSE NOTE - OBJECTIVE STATEMENT
35y male c/o flu like symptoms x 6 days. rpts nonproductive cough, weakness, nasal congestion. denies HA, dizziness, CP, SOB.

## 2025-05-02 NOTE — ED PROVIDER NOTE - PATIENT PORTAL LINK FT
You can access the FollowMyHealth Patient Portal offered by [facility] by registering at the following website: [email or web address]. By joining [employer]’s FollowMyHealth portal, you will also be able to view your health information using other applications (apps) compatible with our system.

## 2025-05-02 NOTE — ED ADULT NURSE NOTE - NSFALLUNIVINTERV_ED_ALL_ED
Bed/Stretcher in lowest position, wheels locked, appropriate side rails in place/Call bell, personal items and telephone in reach/Instruct patient to call for assistance before getting out of bed/chair/stretcher/Non-slip footwear applied when patient is off stretcher/[place] to call system/Physically safe environment - no spills, clutter or unnecessary equipment/Purposeful proactive rounding/Room/bathroom lighting operational, light cord in reach

## 2025-05-02 NOTE — ED PROVIDER NOTE - PROGRESS NOTE DETAILS
symptoms improved. negative xray and strep test. pt updated. return precaution given. fu with pcp. pt agrees and understands plan. dc

## 2025-05-02 NOTE — ED PROVIDER NOTE - WR ORDER STATUS 1
Refill Routing Note   Medication(s) are not appropriate for processing by [facility] for the following reason(s):      - Patient has not been seen in over 15 months by PCP  - Patient has been seen in the ED/Hospital since the last PCP visit    [facility] action(s):  Defer          Medication reconciliation completed: No     Appointments  past 12m or future 3m with PCP    Date Provider   Last Visit   9/25/2020 Frank Noriega MD   Next Visit   Visit date not found Frank Noriega MD   ED visits in past 90 days: 0        Note composed:7:48 AM 04/14/2022            Performed

## 2025-05-02 NOTE — ED ADULT NURSE NOTE - CHIEF COMPLAINT QUOTE
Pt arrived to ED c/o flu like symptoms. Pt endorses HA, itchy throat, eye pain, night sweats. Pt was seen 3 days ago in [facility] ED for sore throat and cough. Pt was prescribed Benzonatate for the cough and after that is when his new symptoms started.  Pt A&Ox4, ambulatory with steady gait, speaking in clear/full sentences, NAD, changed into gown

## 2025-05-02 NOTE — ED PROVIDER NOTE - CLINICAL SUMMARY MEDICAL DECISION MAKING FREE TEXT BOX
DDX: Likely viral syndrome given history, exam, constellation of symptoms.Will r/o strep and PNA.    DX: cxray, strep    TX: IV fluid, toraldol     Dispo: Discharge to follow up with PMD within 3-5 days with precautions for RTED and supportive care recommendations

## 2025-05-02 NOTE — ED ADULT TRIAGE NOTE - CHIEF COMPLAINT QUOTE
Pt arrived to ED c/o flu like symptoms. Pt endorses HA, itchy throat, eye pain, night sweats. Pt was seen 3 days ago in [facility] ED for sore throat and cough. Pt was prescribed Benzonatate for the cough and after that is when his new symptoms started.  Pt A&Ox4, ambulatory with steady gait, speaking in clear/full sentences, NAD, changed into gown

## 2025-05-05 DIAGNOSIS — B34.9 VIRAL INFECTION, UNSPECIFIED: ICD-10-CM

## 2025-05-05 DIAGNOSIS — R05.1 ACUTE COUGH: ICD-10-CM

## 2025-08-03 ENCOUNTER — EMERGENCY (EMERGENCY)
Facility: HOSPITAL | Age: 35
LOS: 1 days | End: 2025-08-03
Admitting: EMERGENCY MEDICINE
Payer: COMMERCIAL

## 2025-08-03 VITALS
OXYGEN SATURATION: 97 % | DIASTOLIC BLOOD PRESSURE: 64 MMHG | HEIGHT: 73 IN | TEMPERATURE: 99 F | RESPIRATION RATE: 20 BRPM | SYSTOLIC BLOOD PRESSURE: 106 MMHG | HEART RATE: 73 BPM | WEIGHT: 274.92 LBS

## 2025-08-03 PROCEDURE — 99283 EMERGENCY DEPT VISIT LOW MDM: CPT | Mod: 25

## 2025-08-03 PROCEDURE — 71046 X-RAY EXAM CHEST 2 VIEWS: CPT

## 2025-08-03 PROCEDURE — 71046 X-RAY EXAM CHEST 2 VIEWS: CPT | Mod: 26

## 2025-08-03 PROCEDURE — 87637 SARSCOV2&INF A&B&RSV AMP PRB: CPT

## 2025-08-03 PROCEDURE — 99284 EMERGENCY DEPT VISIT MOD MDM: CPT

## 2025-08-03 RX ORDER — IBUPROFEN 200 MG
1 TABLET ORAL
Qty: 20 | Refills: 0
Start: 2025-08-03

## 2025-08-03 RX ORDER — BENZONATATE 100 MG
1 CAPSULE ORAL
Qty: 8 | Refills: 0
Start: 2025-08-03 | End: 2025-08-06

## 2025-08-03 RX ORDER — IBUPROFEN 200 MG
600 TABLET ORAL ONCE
Refills: 0 | Status: COMPLETED | OUTPATIENT
Start: 2025-08-03 | End: 2025-08-03

## 2025-08-03 RX ORDER — BENZONATATE 100 MG
100 CAPSULE ORAL ONCE
Refills: 0 | Status: COMPLETED | OUTPATIENT
Start: 2025-08-03 | End: 2025-08-03

## 2025-08-03 RX ADMIN — Medication 600 MILLIGRAM(S): at 10:51

## 2025-08-03 RX ADMIN — Medication 100 MILLIGRAM(S): at 10:51

## 2025-08-06 DIAGNOSIS — R05.1 ACUTE COUGH: ICD-10-CM

## 2025-08-06 DIAGNOSIS — B34.9 VIRAL INFECTION, UNSPECIFIED: ICD-10-CM
